# Patient Record
Sex: FEMALE | Race: WHITE | Employment: FULL TIME | URBAN - METROPOLITAN AREA
[De-identification: names, ages, dates, MRNs, and addresses within clinical notes are randomized per-mention and may not be internally consistent; named-entity substitution may affect disease eponyms.]

---

## 2018-01-18 PROCEDURE — 87186 SC STD MICRODIL/AGAR DIL: CPT | Performed by: OBSTETRICS & GYNECOLOGY

## 2018-01-18 PROCEDURE — 87077 CULTURE AEROBIC IDENTIFY: CPT | Performed by: OBSTETRICS & GYNECOLOGY

## 2018-01-18 PROCEDURE — 87086 URINE CULTURE/COLONY COUNT: CPT | Performed by: OBSTETRICS & GYNECOLOGY

## 2018-01-18 PROCEDURE — 81001 URINALYSIS AUTO W/SCOPE: CPT | Performed by: OBSTETRICS & GYNECOLOGY

## 2018-04-24 ENCOUNTER — APPOINTMENT (OUTPATIENT)
Dept: LAB | Age: 73
End: 2018-04-24
Attending: OPHTHALMOLOGY
Payer: COMMERCIAL

## 2018-04-24 PROCEDURE — 88305 TISSUE EXAM BY PATHOLOGIST: CPT | Performed by: OPHTHALMOLOGY

## 2018-06-18 PROBLEM — M54.9 BACK PAIN, UNSPECIFIED BACK LOCATION, UNSPECIFIED BACK PAIN LATERALITY, UNSPECIFIED CHRONICITY: Status: ACTIVE | Noted: 2018-06-18

## 2018-07-11 ENCOUNTER — TELEPHONE (OUTPATIENT)
Dept: SURGERY | Facility: CLINIC | Age: 73
End: 2018-07-11

## 2018-07-18 ENCOUNTER — TELEPHONE (OUTPATIENT)
Dept: SURGERY | Facility: CLINIC | Age: 73
End: 2018-07-18

## 2018-07-18 ENCOUNTER — OFFICE VISIT (OUTPATIENT)
Dept: SURGERY | Facility: CLINIC | Age: 73
End: 2018-07-18
Payer: COMMERCIAL

## 2018-07-18 VITALS
HEIGHT: 65 IN | SYSTOLIC BLOOD PRESSURE: 128 MMHG | BODY MASS INDEX: 23.16 KG/M2 | OXYGEN SATURATION: 96 % | WEIGHT: 139 LBS | DIASTOLIC BLOOD PRESSURE: 70 MMHG | HEART RATE: 72 BPM

## 2018-07-18 DIAGNOSIS — M43.02 CERVICAL SPONDYLOLYSIS: ICD-10-CM

## 2018-07-18 DIAGNOSIS — M54.12 CERVICAL RADICULITIS: ICD-10-CM

## 2018-07-18 DIAGNOSIS — M48.02 CERVICAL SPINAL STENOSIS: Primary | ICD-10-CM

## 2018-07-18 DIAGNOSIS — M47.812 ARTHROPATHY OF CERVICAL FACET JOINT: ICD-10-CM

## 2018-07-18 PROCEDURE — 99204 OFFICE O/P NEW MOD 45 MIN: CPT | Performed by: ANESTHESIOLOGY

## 2018-07-18 RX ORDER — PREGABALIN 25 MG/1
25 CAPSULE ORAL 2 TIMES DAILY
Qty: 60 CAPSULE | Refills: 0 | Status: ON HOLD | OUTPATIENT
Start: 2018-07-18 | End: 2018-08-21

## 2018-07-18 NOTE — PROGRESS NOTES
HPI:    Patient ID: Markus Hair is a 72year old female. HPI    Review of Systems         Current Outpatient Prescriptions: AmLODIPine Besylate 2.5 MG Oral Tab Take 1 tablet (2.5 mg total) by mouth daily.  Disp: 30 tablet Rfl: 3   diazepam (VALIUM) 5 Constitutional:                  ASSESSMENT/PLAN:   No diagnosis found. No orders of the defined types were placed in this encounter.       Meds This Visit:  No prescriptions requested or ordered in this encounter    Imaging & Referrals:  None       ID#1

## 2018-07-18 NOTE — PATIENT INSTRUCTIONS
Refill policies:    • Allow 2-3 business days for refills; controlled substances may take longer.   • Contact your pharmacy at least 5 days prior to running out of medication and have them send an electronic request or submit request through the “request re entire amount billed. Precertification and Prior Authorizations: If your physician has recommended that you have a procedure or additional testing performed.   [facility]) will contact your insurance carrier to obtain pre-certi update us prior to the procedure if you are experiencing any symptoms of infection such as cough, fever, chills, urinary symptoms, or have recently been prescribed antibiotics, have open wounds, have recently had surgery or dental procedures.      As you wi days  • Pradaxa 10 days  • Trental 7 days  • Eliquis (Apixaban) 3 days  • Xarelto (Rivaroxaban) 3 days  • Lovenox (Enoxaparin) 24 hours  • Aspirin  • 81mg 24 hours  • Greater than 81 mg (325mg) 7 days  • Coumadin       5 days  • Procedure may be cancelled minutes off) for comfort.     ** TO AVOID CANCELLATION AND/OR RESCHEDULING: PLEASE CALL [facility] PRE-PROCEDURE LINE [phone] FOR DETAILED INSTRUCTIONS FIVE TO SEVEN DAYS PRIOR TO PROCEDURE**

## 2018-07-20 NOTE — PROGRESS NOTES
Name: Ely Walsh   : 1945   DOS: 2018     Chief complaint: Neck Pain    History of present illness: Ely Walsh is a 72year old female complaining of pain in the neck for long time.   But recently her pain got significantly worse after a degeneration    • Microscopic hematuria 8/10/2016   • Osteopenia    • Primary osteoarthritis of both hips 8/8/2016   • Pure hypercholesterolemia    • S/P laparoscopic-assisted sigmoidectomy 10/14/2014   • Unspecified essential hypertension    • VARICELLA D-HIST D 3.5-45-1 MG OR TB12 1 TABLET  DAILY Disp:  Rfl:    FISH OIL OR None Entered Disp:  Rfl:    BABY ASPIRIN 81 MG OR CHEW 1TABLET DAILY Disp:  Rfl:      Past Surgical History:  1977: CABG, ARTERIAL, SINGLE  7/2012: COLONOSCOPY      Comment: Colonosc MD STEPHANY;  Location: [address] [place] [address]   Family History   Problem Relation Age of Onset   • Breast Cancer Mother      age 61   • Heart Disease Mother      CHF   • Breast Cancer Other      pat uncle-70's?    • Cancer Father      lymphoma Ht 65\"   Wt 139 lb   SpO2 96%   BMI 23.13 kg/m²    The patient is awake, alert, oriented and corporative. She has a normal affect. The patient ambulates with normal gait. HEENT: No gross lesion noted. PEERL. No icterus. Neck and Upper Extremity: Supple. 5    5   Knee Extension:   5    5   Knee Flexion:    5    5   Ant.  Tibialis:    5    5  Extensor Hallucis Longus:   5    5  Peroneals:     5    5  Gastrocsoleus:     5    5    Deep Tendon Reflexes:             (R)   (L)   Patella:    2   2   Ankle:    1

## 2018-07-30 ENCOUNTER — TELEPHONE (OUTPATIENT)
Dept: SURGERY | Facility: CLINIC | Age: 73
End: 2018-07-30

## 2018-08-03 ENCOUNTER — TELEPHONE (OUTPATIENT)
Dept: SURGERY | Facility: CLINIC | Age: 73
End: 2018-08-03

## 2018-08-03 NOTE — TELEPHONE ENCOUNTER
Spoke to patient, confirmed procedure date of 08/07 and to be checked in at outpatient registration at 1:15 pm. Patient called pre-procedure line and understood instructions.  Patient instructed to call office if there are additional questions after listeni

## 2018-08-07 ENCOUNTER — HOSPITAL ENCOUNTER (OUTPATIENT)
Facility: HOSPITAL | Age: 73
Setting detail: HOSPITAL OUTPATIENT SURGERY
Discharge: HOME | End: 2018-08-07
Attending: ANESTHESIOLOGY | Admitting: ANESTHESIOLOGY
Payer: COMMERCIAL

## 2018-08-07 ENCOUNTER — SURGERY (OUTPATIENT)
Age: 73
End: 2018-08-07

## 2018-08-07 ENCOUNTER — APPOINTMENT (OUTPATIENT)
Dept: GENERAL RADIOLOGY | Facility: HOSPITAL | Age: 73
End: 2018-08-07
Attending: ANESTHESIOLOGY
Payer: COMMERCIAL

## 2018-08-07 VITALS
DIASTOLIC BLOOD PRESSURE: 75 MMHG | RESPIRATION RATE: 14 BRPM | OXYGEN SATURATION: 100 % | TEMPERATURE: 98 F | HEART RATE: 58 BPM | SYSTOLIC BLOOD PRESSURE: 122 MMHG

## 2018-08-07 DIAGNOSIS — M43.02 CERVICAL SPONDYLOLYSIS: ICD-10-CM

## 2018-08-07 DIAGNOSIS — M48.02 CERVICAL SPINAL STENOSIS: ICD-10-CM

## 2018-08-07 PROCEDURE — 99152 MOD SED SAME PHYS/QHP 5/>YRS: CPT | Performed by: ANESTHESIOLOGY

## 2018-08-07 PROCEDURE — 3E0U3BZ INTRODUCTION OF ANESTHETIC AGENT INTO JOINTS, PERCUTANEOUS APPROACH: ICD-10-PCS | Performed by: ANESTHESIOLOGY

## 2018-08-07 PROCEDURE — 3E0U33Z INTRODUCTION OF ANTI-INFLAMMATORY INTO JOINTS, PERCUTANEOUS APPROACH: ICD-10-PCS | Performed by: ANESTHESIOLOGY

## 2018-08-07 RX ORDER — ONDANSETRON 2 MG/ML
4 INJECTION INTRAMUSCULAR; INTRAVENOUS ONCE AS NEEDED
Status: DISCONTINUED | OUTPATIENT
Start: 2018-08-07 | End: 2018-08-07

## 2018-08-07 RX ORDER — 0.9 % SODIUM CHLORIDE 0.9 %
VIAL (ML) INJECTION AS NEEDED
Status: DISCONTINUED | OUTPATIENT
Start: 2018-08-07 | End: 2018-08-07 | Stop reason: HOSPADM

## 2018-08-07 RX ORDER — MIDAZOLAM HYDROCHLORIDE 1 MG/ML
INJECTION INTRAMUSCULAR; INTRAVENOUS AS NEEDED
Status: DISCONTINUED | OUTPATIENT
Start: 2018-08-07 | End: 2018-08-07 | Stop reason: HOSPADM

## 2018-08-07 RX ORDER — DEXAMETHASONE SODIUM PHOSPHATE 10 MG/ML
INJECTION, SOLUTION INTRAMUSCULAR; INTRAVENOUS AS NEEDED
Status: DISCONTINUED | OUTPATIENT
Start: 2018-08-07 | End: 2018-08-07 | Stop reason: HOSPADM

## 2018-08-07 RX ORDER — LIDOCAINE HYDROCHLORIDE 10 MG/ML
INJECTION, SOLUTION EPIDURAL; INFILTRATION; INTRACAUDAL; PERINEURAL AS NEEDED
Status: DISCONTINUED | OUTPATIENT
Start: 2018-08-07 | End: 2018-08-07 | Stop reason: HOSPADM

## 2018-08-07 RX ORDER — SODIUM CHLORIDE, SODIUM LACTATE, POTASSIUM CHLORIDE, CALCIUM CHLORIDE 600; 310; 30; 20 MG/100ML; MG/100ML; MG/100ML; MG/100ML
100 INJECTION, SOLUTION INTRAVENOUS CONTINUOUS
Status: DISCONTINUED | OUTPATIENT
Start: 2018-08-07 | End: 2018-08-07

## 2018-08-07 RX ORDER — DIPHENHYDRAMINE HYDROCHLORIDE 50 MG/ML
50 INJECTION INTRAMUSCULAR; INTRAVENOUS ONCE AS NEEDED
Status: DISCONTINUED | OUTPATIENT
Start: 2018-08-07 | End: 2018-08-07

## 2018-08-07 RX ORDER — ONDANSETRON 2 MG/ML
4 INJECTION INTRAMUSCULAR; INTRAVENOUS ONCE AS NEEDED
Status: DISCONTINUED | OUTPATIENT
Start: 2018-08-07 | End: 2018-08-07 | Stop reason: HOSPADM

## 2018-08-07 NOTE — OPERATIVE REPORT
[facility]  Operative Report  2018     Yelena Serrato Patient Status:  Hospital Outpatient Surgery    1945 MRN [id number]   [facility] [place] [facility] Attending Fabian Bacon MD   Hosp Day # 0 PCP Del Dominguez MD introduced and advanced into each corresponding facet joint atraumatically at left C3-C4, C4-C5, C5-C6 and C6-C7 level under fluoroscopic guidance.   Following negative aspiration for CSF and blood, approximately, 0.5 mL of 1% lidocaine with 10 mg of Decadr

## 2018-08-07 NOTE — H&P
History & Physical Examination    Patient Name: Yaz Wade  MRN: [id number]  [facility]: [id number]  YOB: 1945    Pre-Operative Diagnosis:  Cervical spinal stenosis [M48.02]  Cervical spondylolysis [M43.02]    Present Illness: A 72year old female POLYPECTOMY 38348,49313;  Surgeon: Juan Rothman MD;  Location: [address] [place] [address]  3/7/2016: [address] NDL/CATH SPI DX/THER QLB N/A      Comment: Procedure: CERVICAL EPIDURAL INJECTION;                 [address] Standard drinks or equivalent per week         Comment: 1 glass of wine daily       SYSTEM Check if Review is Normal Check if Physical Exam is Normal If not normal, please explain:   HEENT [x ] [x ]    NECK & BACK [ ] [ ] Tenderness to palpation left cervi

## 2018-08-08 ENCOUNTER — TELEPHONE (OUTPATIENT)
Dept: SURGERY | Facility: CLINIC | Age: 73
End: 2018-08-08

## 2018-08-08 NOTE — TELEPHONE ENCOUNTER
Pt. Had question regarding nausea and if she could take a prescribed half of a sleep aid. I advised the patient to take in plenty of fluids and that should help dissipate any lingering feelings of sedation.  And after she is able to keep fluids and food priscila

## 2018-08-16 ENCOUNTER — TELEPHONE (OUTPATIENT)
Dept: SURGERY | Facility: CLINIC | Age: 73
End: 2018-08-16

## 2018-08-16 NOTE — TELEPHONE ENCOUNTER
Spoke to patient, confirmed procedure date of 08/21 and to be checked in at outpatient registration at 10:00 am. Patient instructed to call pre-procedure line before procedure at [phone].  Patient instructed to call office if there are additional quest

## 2018-08-16 NOTE — TELEPHONE ENCOUNTER
Spoke with pt and reviewed pre-procedure instructions and medications that should be held prior to procedure. Pt verbalized understanding and had no additional questions.

## 2018-08-21 ENCOUNTER — SURGERY (OUTPATIENT)
Age: 73
End: 2018-08-21

## 2018-08-21 ENCOUNTER — APPOINTMENT (OUTPATIENT)
Dept: GENERAL RADIOLOGY | Facility: HOSPITAL | Age: 73
End: 2018-08-21
Attending: ANESTHESIOLOGY
Payer: COMMERCIAL

## 2018-08-21 ENCOUNTER — HOSPITAL ENCOUNTER (OUTPATIENT)
Facility: HOSPITAL | Age: 73
Setting detail: HOSPITAL OUTPATIENT SURGERY
Discharge: HOME | End: 2018-08-21
Attending: ANESTHESIOLOGY | Admitting: ANESTHESIOLOGY
Payer: COMMERCIAL

## 2018-08-21 VITALS
RESPIRATION RATE: 18 BRPM | TEMPERATURE: 98 F | HEART RATE: 68 BPM | SYSTOLIC BLOOD PRESSURE: 139 MMHG | OXYGEN SATURATION: 100 % | DIASTOLIC BLOOD PRESSURE: 68 MMHG

## 2018-08-21 DIAGNOSIS — M43.02 CERVICAL SPONDYLOLYSIS: ICD-10-CM

## 2018-08-21 DIAGNOSIS — M48.02 CERVICAL SPINAL STENOSIS: ICD-10-CM

## 2018-08-21 PROCEDURE — 3E0R33Z INTRODUCTION OF ANTI-INFLAMMATORY INTO SPINAL CANAL, PERCUTANEOUS APPROACH: ICD-10-PCS | Performed by: ANESTHESIOLOGY

## 2018-08-21 PROCEDURE — 99152 MOD SED SAME PHYS/QHP 5/>YRS: CPT | Performed by: ANESTHESIOLOGY

## 2018-08-21 RX ORDER — ONDANSETRON 2 MG/ML
4 INJECTION INTRAMUSCULAR; INTRAVENOUS ONCE AS NEEDED
Status: DISCONTINUED | OUTPATIENT
Start: 2018-08-21 | End: 2018-08-21

## 2018-08-21 RX ORDER — LIDOCAINE HYDROCHLORIDE 10 MG/ML
INJECTION, SOLUTION EPIDURAL; INFILTRATION; INTRACAUDAL; PERINEURAL AS NEEDED
Status: DISCONTINUED | OUTPATIENT
Start: 2018-08-21 | End: 2018-08-21 | Stop reason: HOSPADM

## 2018-08-21 RX ORDER — DEXAMETHASONE SODIUM PHOSPHATE 10 MG/ML
INJECTION, SOLUTION INTRAMUSCULAR; INTRAVENOUS AS NEEDED
Status: DISCONTINUED | OUTPATIENT
Start: 2018-08-21 | End: 2018-08-21 | Stop reason: HOSPADM

## 2018-08-21 RX ORDER — 0.9 % SODIUM CHLORIDE 0.9 %
VIAL (ML) INJECTION AS NEEDED
Status: DISCONTINUED | OUTPATIENT
Start: 2018-08-21 | End: 2018-08-21 | Stop reason: HOSPADM

## 2018-08-21 RX ORDER — DIPHENHYDRAMINE HYDROCHLORIDE 50 MG/ML
50 INJECTION INTRAMUSCULAR; INTRAVENOUS ONCE AS NEEDED
Status: DISCONTINUED | OUTPATIENT
Start: 2018-08-21 | End: 2018-08-21

## 2018-08-21 RX ORDER — ONDANSETRON 2 MG/ML
4 INJECTION INTRAMUSCULAR; INTRAVENOUS ONCE AS NEEDED
Status: DISCONTINUED | OUTPATIENT
Start: 2018-08-21 | End: 2018-08-21 | Stop reason: HOSPADM

## 2018-08-21 RX ORDER — SODIUM CHLORIDE, SODIUM LACTATE, POTASSIUM CHLORIDE, CALCIUM CHLORIDE 600; 310; 30; 20 MG/100ML; MG/100ML; MG/100ML; MG/100ML
100 INJECTION, SOLUTION INTRAVENOUS CONTINUOUS
Status: DISCONTINUED | OUTPATIENT
Start: 2018-08-21 | End: 2018-08-21

## 2018-08-21 RX ORDER — MIDAZOLAM HYDROCHLORIDE 1 MG/ML
INJECTION INTRAMUSCULAR; INTRAVENOUS AS NEEDED
Status: DISCONTINUED | OUTPATIENT
Start: 2018-08-21 | End: 2018-08-21 | Stop reason: HOSPADM

## 2018-08-21 NOTE — H&P
History & Physical Examination    Patient Name: Chaya Harrison  MRN: [id number]  [facility]: [id number]  YOB: 1945    Pre-Operative Diagnosis:  Cervical spinal stenosis [M48.02]  Cervical spondylolysis [M43.02]    Present Illness: A 72year old female (GLUCOSAMINE RELIEF OR) Take by mouth. Disp:  Rfl:  Taking   Rosuvastatin Calcium (CRESTOR) 20 MG Oral Tab Take 1 tablet by mouth nightly. Disp: 1 tablet Rfl: 0 Taking   Ergocalciferol (VITAMIN D OR) Take  by mouth.  Disp:  Rfl:  Taking   D-HIST D 3.5-45-1 Procedure: ESOPHAGOGASTRODUODENOSCOPY,                COLONOSCOPY, POSSIBLE BIOPSY, POSSIBLE                POLYPECTOMY 15765,03979;  Surgeon: Ambika Fernandez MD;  Location: [address] [place] [address]  3/7/2016: FLUOR GID & L date: 8/15/1980    Smokeless tobacco: Never Used    Comment: quit 1977    Alcohol use Yes  8.4 oz/week    14 Standard drinks or equivalent per week         Comment: 1 glass of wine daily       SYSTEM Check if Review is Normal Check if Physical Exam is Norm

## 2018-08-23 NOTE — OPERATIVE REPORT
[facility]  Operative Report  2018     Allie Reaves Patient Status:  Hospital Outpatient Surgery    1945 MRN [id number]   [facility] No att. providers found   [facility] Day # 0 PCP Bridgett Garcia, 20-gauge Tuohy needle was introduced and advanced under fluoroscopy at C6-C7 level. The epidural space was reached by using a loss of resistance to air technique. There was no C. S.F. or blood through the needle.  After obtaining a good epidurogram by injec

## 2018-09-05 ENCOUNTER — OFFICE VISIT (OUTPATIENT)
Dept: PAIN CLINIC | Facility: CLINIC | Age: 73
End: 2018-09-05
Payer: COMMERCIAL

## 2018-09-05 VITALS
SYSTOLIC BLOOD PRESSURE: 116 MMHG | WEIGHT: 139 LBS | DIASTOLIC BLOOD PRESSURE: 60 MMHG | OXYGEN SATURATION: 93 % | BODY MASS INDEX: 23.16 KG/M2 | HEIGHT: 65 IN | HEART RATE: 76 BPM

## 2018-09-05 DIAGNOSIS — M54.12 CERVICAL RADICULITIS: Primary | ICD-10-CM

## 2018-09-05 PROCEDURE — 99214 OFFICE O/P EST MOD 30 MIN: CPT | Performed by: ANESTHESIOLOGY

## 2018-09-05 NOTE — PATIENT INSTRUCTIONS
Refill policies:    • Allow 2-3 business days for refills; controlled substances may take longer.   • Contact your pharmacy at least 5 days prior to running out of medication and have them send an electronic request or submit request through the “request re entire amount billed. Precertification and Prior Authorizations: If your physician has recommended that you have a procedure or additional testing performed.   [facility]) will contact your insurance carrier to obtain pre-certi

## 2018-09-06 ENCOUNTER — TELEPHONE (OUTPATIENT)
Dept: SURGERY | Facility: CLINIC | Age: 73
End: 2018-09-06

## 2018-09-06 DIAGNOSIS — M54.12 CERVICAL RADICULOPATHY: Primary | ICD-10-CM

## 2018-09-06 DIAGNOSIS — M47.812 ARTHROPATHY OF CERVICAL FACET JOINT: ICD-10-CM

## 2018-09-06 DIAGNOSIS — G89.29 CHRONIC THORACIC BACK PAIN, UNSPECIFIED BACK PAIN LATERALITY: ICD-10-CM

## 2018-09-06 DIAGNOSIS — M54.6 CHRONIC THORACIC BACK PAIN, UNSPECIFIED BACK PAIN LATERALITY: ICD-10-CM

## 2018-09-06 NOTE — TELEPHONE ENCOUNTER
Pts last work note had a return date of 9/21/18 but Pt is seeing Dr on 9/24/18 an will not return to work until 9/25/18.  Please revise note and fax to [facility], Cristiano, at [phone] and [employer], Kenny Burris, at [phone]

## 2018-09-06 NOTE — TELEPHONE ENCOUNTER
Spoke with patient and informed patient of message below. Pt verbalized understanding. See other TE dated 9-6-18 for physical therapy order.

## 2018-09-06 NOTE — TELEPHONE ENCOUNTER
Spoke with pt who needs the referral to say \"work conditioning\" not work hardening and then the physical therapy can be done at [facility]. Pt has no additional needs at this time.

## 2018-09-06 NOTE — TELEPHONE ENCOUNTER
Compound prescription, insurance information and face sheet faxed to [employer], fax confirmation received.

## 2018-09-06 NOTE — TELEPHONE ENCOUNTER
Work note return to work date has been adjusted, ok per Dr. Alonzo Stevens. Also,she should contact AT physical therapy, Christian Starr should be able to help with the work conditioning.   Thanks

## 2018-09-06 NOTE — TELEPHONE ENCOUNTER
Pt is unable to find a PT who does both work hardening and work conditioning therapies. Can Dr provide a PT who does both? Or can Dr revise order to state only work conditioning?

## 2018-09-10 ENCOUNTER — OFFICE VISIT (OUTPATIENT)
Dept: PHYSICAL THERAPY | Age: 73
End: 2018-09-10
Attending: NURSE PRACTITIONER
Payer: COMMERCIAL

## 2018-09-10 PROCEDURE — 97162 PT EVAL MOD COMPLEX 30 MIN: CPT

## 2018-09-10 PROCEDURE — 97110 THERAPEUTIC EXERCISES: CPT

## 2018-09-10 NOTE — PROGRESS NOTES
Name: Lukas Lopez   : 1945   DOS: 2018     Pain Clinic Follow Up Visit:   Lukas Lopez is a 72year old female who presents for recheck of her chronic neck pain.  She is status post left diagnostic cervical facet joint injection and cervical f food Disp: 60 tablet Rfl: 0   Cetirizine HCl (ZYRTEC ALLERGY) 10 MG Oral Cap Take 1 tablet by mouth daily.  Disp:  Rfl:    amoxicillin 500 MG Oral Cap TAKE 4 CAPSULES BY MOUTH 1HR PRIOR TO EACH DENTAL VISIT Disp:  Rfl: 1   Resveratrol 100 MG Oral Cap Take 1 filled today:  Requested Prescriptions     Signed Prescriptions Disp Refills   • CUSTOM MEDICATION 1 each 11     Sig: CMPD Flurbiprofen 3.5%, Cyclobenzaprine 0.5%, Gabapentin 1%, Lidocaine 2.25%, Prilocaine 2.25%    Apply 4 grams to affected area three to

## 2018-09-10 NOTE — PROGRESS NOTES
SPINE EVALUATION:   Referring Physician: Dr. Ceci Machado MD  Diagnosis: Cervical radiculopathy     Date of Service: 9/10/2018     PATIENT SUMMARY   Markus Hair is a 72year old y/o female who presents to therapy today with complaints of 3 month his tightness  Levator Scap: moderate tightness  Pec Major: moderate tightness     Special tests: Positive special tests, neural tension, vertebral artery    Balance: Not tested today    Today’s Treatment and Response: Supine manual traction cervical spine gra any questions, please contact me at Dept: [phone]    Sincerely,  Electronically signed by therapist: Charlie Heredia    Physician's certification required: Yes  I certify the need for these services furnished under this plan of treatment and while

## 2018-09-12 ENCOUNTER — OFFICE VISIT (OUTPATIENT)
Dept: PHYSICAL THERAPY | Age: 73
End: 2018-09-12
Attending: NURSE PRACTITIONER
Payer: COMMERCIAL

## 2018-09-12 PROCEDURE — 97012 MECHANICAL TRACTION THERAPY: CPT

## 2018-09-12 PROCEDURE — 97110 THERAPEUTIC EXERCISES: CPT

## 2018-09-12 PROCEDURE — 97140 MANUAL THERAPY 1/> REGIONS: CPT

## 2018-09-13 NOTE — PROGRESS NOTES
Dx: Cervical radiculopathy         Authorized # of Visits:  8         Next MD visit: none scheduled  Fall Risk: standard         Precautions: Osteoporosis Heart disease.               Subjective: Pain neck and right arm 4-6/10, numbness and tingling in the hold 1 min rest repeated 6 times         Pt education 10 mins         Seated shrugs x 10, scap squeezes x 10         Seated isometric cervical spine flex, ext, side flex, rotation x 10 each           Charges: MTx, MT 1, EX 1       Total Timed Treatment: 45

## 2018-09-14 NOTE — TELEPHONE ENCOUNTER
Pt requesting letter that was sent needs to be signed by provider and sent back.  Would like it to be sent thru [facility]

## 2018-09-14 NOTE — TELEPHONE ENCOUNTER
Pt stated that she received corrected letter with dates of 9/5/18-9/25/18 in the mail; however, letter was not signed and her work will not accept without a signature.      Pt informed that Dr Jose Manuel Fair can sign letter, we can not send signed letter through [facility]

## 2018-09-18 ENCOUNTER — OFFICE VISIT (OUTPATIENT)
Dept: PHYSICAL THERAPY | Age: 73
End: 2018-09-18
Attending: NURSE PRACTITIONER
Payer: COMMERCIAL

## 2018-09-18 PROCEDURE — 97140 MANUAL THERAPY 1/> REGIONS: CPT

## 2018-09-18 PROCEDURE — 97110 THERAPEUTIC EXERCISES: CPT

## 2018-09-19 NOTE — PROGRESS NOTES
Dx: Cervical radiculopathy         Authorized # of Visits:  8         Next MD visit: none scheduled  Fall Risk: standard         Precautions: Osteoporosis Heart disease.               Subjective: Pain neck and right arm 4-6/10, numbness and tingling in the mobilizations to the left grade 2-3 5 mins        Supine sh depression stretch 3 x 10 sec hold Supine ULNT 1 L through full range x 10        Seated rotation mobilizations with movement grade 2-3 C4, 5, 6 x 10 each Seated rotation mobilizations with moveme

## 2018-09-21 ENCOUNTER — OFFICE VISIT (OUTPATIENT)
Dept: PHYSICAL THERAPY | Age: 73
End: 2018-09-21
Attending: NURSE PRACTITIONER
Payer: COMMERCIAL

## 2018-09-21 PROCEDURE — 97140 MANUAL THERAPY 1/> REGIONS: CPT

## 2018-09-21 PROCEDURE — 97110 THERAPEUTIC EXERCISES: CPT

## 2018-09-21 PROCEDURE — 97014 ELECTRIC STIMULATION THERAPY: CPT

## 2018-09-21 NOTE — PROGRESS NOTES
Dx: Cervical radiculopathy         Authorized # of Visits:  8         Next MD visit: 9/24/18  Fall Risk: standard         Precautions: Osteoporosis Heart disease. Subjective: Increased neck pain following the last therapy session.  This week I Date:               TX#: 5/ Date:               TX#: 6/ Date:               TX#: 7/ Date:               TX#: 8/   Supine PROM cervical spine side flexion, rotation R/L x 10 each Supine PROM cervical spine side flexion, rotation R/L x 10 each Supine sh depr

## 2018-09-24 ENCOUNTER — TELEPHONE (OUTPATIENT)
Dept: PAIN CLINIC | Facility: CLINIC | Age: 73
End: 2018-09-24

## 2018-09-24 ENCOUNTER — TELEPHONE (OUTPATIENT)
Dept: SURGERY | Facility: CLINIC | Age: 73
End: 2018-09-24

## 2018-09-24 ENCOUNTER — OFFICE VISIT (OUTPATIENT)
Dept: PAIN CLINIC | Facility: CLINIC | Age: 73
End: 2018-09-24
Payer: COMMERCIAL

## 2018-09-24 ENCOUNTER — OFFICE VISIT (OUTPATIENT)
Dept: PHYSICAL THERAPY | Age: 73
End: 2018-09-24
Attending: NURSE PRACTITIONER
Payer: COMMERCIAL

## 2018-09-24 VITALS
BODY MASS INDEX: 23 KG/M2 | SYSTOLIC BLOOD PRESSURE: 116 MMHG | HEIGHT: 65 IN | DIASTOLIC BLOOD PRESSURE: 66 MMHG | OXYGEN SATURATION: 96 % | HEART RATE: 71 BPM

## 2018-09-24 DIAGNOSIS — M54.12 CERVICAL RADICULOPATHY: Primary | ICD-10-CM

## 2018-09-24 PROCEDURE — 97110 THERAPEUTIC EXERCISES: CPT

## 2018-09-24 PROCEDURE — 97014 ELECTRIC STIMULATION THERAPY: CPT

## 2018-09-24 PROCEDURE — 99213 OFFICE O/P EST LOW 20 MIN: CPT | Performed by: NURSE PRACTITIONER

## 2018-09-24 PROCEDURE — 97140 MANUAL THERAPY 1/> REGIONS: CPT

## 2018-09-24 NOTE — PATIENT INSTRUCTIONS
Refill policies:    • Allow 2-3 business days for refills; controlled substances may take longer.   • Contact your pharmacy at least 5 days prior to running out of medication and have them send an electronic request or submit request through the “request re entire amount billed. Precertification and Prior Authorizations: If your physician has recommended that you have a procedure or additional testing performed.   [facility]) will contact your insurance carrier to obtain pre-certi

## 2018-09-24 NOTE — TELEPHONE ENCOUNTER
Pt asking for extended leave of absence note to excuse her until after her Post Injection FU.  Pt states she \"already gave Darrona Railing the fax numbers\"

## 2018-09-24 NOTE — PROGRESS NOTES
Name: Alec Belcher   : 1945   DOS: 2018     Pain Clinic Follow Up Visit:   Alec Belcher is a 72year old female who presents for recheck of her chronic neck pain.     Status post: 2018: Cervical epidural steroid injection   total) by mouth 2 (two) times daily as needed. Take with food Disp: 60 tablet Rfl: 0   Cetirizine HCl (ZYRTEC ALLERGY) 10 MG Oral Cap Take 1 tablet by mouth daily. Disp:  Rfl:    Resveratrol 100 MG Oral Cap Take 1 tablet by mouth daily.  Disp:  Rfl:    Calc would like to proceed. She prefers her injection with sedation. She does experience nausea with sedation; therefore, I have educated her that she will be able to receive Zofran prior to the procedure to help minimize nausea.   We will pre-certify the inje

## 2018-09-24 NOTE — TELEPHONE ENCOUNTER
Pt seen in [address] today by Ceasar Capellan and recommended for NEFTALI (X 2). Please begin PA process for procedure(s).      Laterality: n/a  Level(s): n/a    Pt informed callback will be given when PA feedback received and procedure date to be scheduled after approv

## 2018-09-24 NOTE — PROGRESS NOTES
Dx: Cervical radiculopathy         Authorized # of Visits:  8         Next MD visit: 9/24/18  Fall Risk: standard         Precautions: Osteoporosis Heart disease.             Progress report:  Subjective: Pain at the base of the neck \" the bobble head\" 3/ depression stretch 3 x 10 sec hold Seated AROM cervical spine flex, side flex, rotation       Supine cervical spine distraction mobilizations Grade 2 30 secs  Grade 3 30 secs Supine cervical spine joint mobilizations grade 2-3 distraction 5 mins Supine PRO

## 2018-09-25 NOTE — TELEPHONE ENCOUNTER
I have completed the letter and will return to Albina when she comes today. Please fax to previous work numbers that should be on file for patient. If you cannot find them, please call her, she is happy to give them to you again. Thanks!

## 2018-09-25 NOTE — TELEPHONE ENCOUNTER
Spoke with pt that states she needs to have a date otherwise the [employer] will send her file to a disciplinary action. As of  9-25-18 pt's leave is up.  Pt is requesting a letter be sent with a date on it of at least two weeks, after that two week period pt wo

## 2018-09-25 NOTE — TELEPHONE ENCOUNTER
Pt called stating employer will need return dates for work and will need signature as well. Once completed please fax back to employer.

## 2018-09-25 NOTE — TELEPHONE ENCOUNTER
Letter faxed to numbers below, confirmation received. Rylee Huddleston, at [phone] and   [employer] at [phone].

## 2018-09-25 NOTE — TELEPHONE ENCOUNTER
Spoke with DONNY Grace. New letter pended for Dr. Demetrio toro with a return to work date of 11-1-18.

## 2018-10-01 NOTE — TELEPHONE ENCOUNTER
Started PA for NEFTALI 594[address] on [facility]  Uploaded clinical notes   Pending Ref# [id number]

## 2018-10-02 NOTE — TELEPHONE ENCOUNTER
Pt called stating she spoke with her insurance carrier they informed her that one of the two procedures has been approved.  Pt would like to schedule injection per pt approval expires 10/11/18

## 2018-10-12 ENCOUNTER — TELEPHONE (OUTPATIENT)
Dept: SURGERY | Facility: CLINIC | Age: 73
End: 2018-10-12

## 2018-10-12 ENCOUNTER — TELEPHONE (OUTPATIENT)
Dept: PAIN CLINIC | Facility: CLINIC | Age: 73
End: 2018-10-12

## 2018-10-12 NOTE — TELEPHONE ENCOUNTER
Patient would also like to schedule the next two procedures a week apart due to being off work. Please call, Monday okay. Spoke to patient, confirmed procedure date of 10/18/18 at 1:30 pm to be checked in at outpatient registration.  Patient instructed t

## 2018-10-12 NOTE — TELEPHONE ENCOUNTER
Per message below, patient requesting to schedule next 2 NEFTALI procedures. Per OV notes, DONNY Penaloza. Advising 2 total procedures. First procedure is scheduled for 10/18/18. New encounter for this message opened up for today's date.

## 2018-10-12 NOTE — TELEPHONE ENCOUNTER
Per pre-procedure outreach call from 10/12/18, patient requesting to schedule next 2 NEFTALI procedures. Per OV notes, DONNY Dias. Advising 2 total procedures. First procedure is scheduled for 10/18/18.       Routed to PA Department-Are we able to do P

## 2018-10-15 NOTE — TELEPHONE ENCOUNTER
Prior authorization request for 2nd NEFTALI submitted via [facility] online, clinical notes uploaded directly, request pending, pending reference [id number]

## 2018-10-18 ENCOUNTER — APPOINTMENT (OUTPATIENT)
Dept: GENERAL RADIOLOGY | Facility: HOSPITAL | Age: 73
End: 2018-10-18
Attending: ANESTHESIOLOGY
Payer: COMMERCIAL

## 2018-10-18 ENCOUNTER — HOSPITAL ENCOUNTER (OUTPATIENT)
Facility: HOSPITAL | Age: 73
Setting detail: HOSPITAL OUTPATIENT SURGERY
Discharge: HOME | End: 2018-10-18
Attending: ANESTHESIOLOGY | Admitting: ANESTHESIOLOGY
Payer: COMMERCIAL

## 2018-10-18 VITALS
DIASTOLIC BLOOD PRESSURE: 74 MMHG | HEART RATE: 72 BPM | OXYGEN SATURATION: 95 % | TEMPERATURE: 98 F | RESPIRATION RATE: 18 BRPM | SYSTOLIC BLOOD PRESSURE: 140 MMHG

## 2018-10-18 DIAGNOSIS — M54.12 CERVICAL RADICULOPATHY: ICD-10-CM

## 2018-10-18 PROCEDURE — 99152 MOD SED SAME PHYS/QHP 5/>YRS: CPT | Performed by: ANESTHESIOLOGY

## 2018-10-18 PROCEDURE — 3E0R33Z INTRODUCTION OF ANTI-INFLAMMATORY INTO SPINAL CANAL, PERCUTANEOUS APPROACH: ICD-10-PCS | Performed by: ANESTHESIOLOGY

## 2018-10-18 PROCEDURE — 3E0R3KZ INTRODUCTION OF OTHER DIAGNOSTIC SUBSTANCE INTO SPINAL CANAL, PERCUTANEOUS APPROACH: ICD-10-PCS | Performed by: ANESTHESIOLOGY

## 2018-10-18 RX ORDER — 0.9 % SODIUM CHLORIDE 0.9 %
VIAL (ML) INJECTION AS NEEDED
Status: DISCONTINUED | OUTPATIENT
Start: 2018-10-18 | End: 2018-10-18 | Stop reason: HOSPADM

## 2018-10-18 RX ORDER — DIPHENHYDRAMINE HYDROCHLORIDE 50 MG/ML
50 INJECTION INTRAMUSCULAR; INTRAVENOUS ONCE AS NEEDED
Status: DISCONTINUED | OUTPATIENT
Start: 2018-10-18 | End: 2018-10-18

## 2018-10-18 RX ORDER — ONDANSETRON 2 MG/ML
INJECTION INTRAMUSCULAR; INTRAVENOUS
Status: DISCONTINUED
Start: 2018-10-18 | End: 2018-10-18

## 2018-10-18 RX ORDER — DEXAMETHASONE SODIUM PHOSPHATE 10 MG/ML
INJECTION, SOLUTION INTRAMUSCULAR; INTRAVENOUS AS NEEDED
Status: DISCONTINUED | OUTPATIENT
Start: 2018-10-18 | End: 2018-10-18 | Stop reason: HOSPADM

## 2018-10-18 RX ORDER — LIDOCAINE HYDROCHLORIDE 10 MG/ML
INJECTION, SOLUTION EPIDURAL; INFILTRATION; INTRACAUDAL; PERINEURAL AS NEEDED
Status: DISCONTINUED | OUTPATIENT
Start: 2018-10-18 | End: 2018-10-18 | Stop reason: HOSPADM

## 2018-10-18 RX ORDER — SODIUM CHLORIDE, SODIUM LACTATE, POTASSIUM CHLORIDE, CALCIUM CHLORIDE 600; 310; 30; 20 MG/100ML; MG/100ML; MG/100ML; MG/100ML
100 INJECTION, SOLUTION INTRAVENOUS CONTINUOUS
Status: DISCONTINUED | OUTPATIENT
Start: 2018-10-18 | End: 2018-10-18

## 2018-10-18 RX ORDER — MIDAZOLAM HYDROCHLORIDE 1 MG/ML
INJECTION INTRAMUSCULAR; INTRAVENOUS AS NEEDED
Status: DISCONTINUED | OUTPATIENT
Start: 2018-10-18 | End: 2018-10-18 | Stop reason: HOSPADM

## 2018-10-18 RX ORDER — ONDANSETRON 2 MG/ML
4 INJECTION INTRAMUSCULAR; INTRAVENOUS ONCE AS NEEDED
Status: COMPLETED | OUTPATIENT
Start: 2018-10-18 | End: 2018-10-18

## 2018-10-18 RX ORDER — ONDANSETRON 2 MG/ML
4 INJECTION INTRAMUSCULAR; INTRAVENOUS ONCE AS NEEDED
Status: DISCONTINUED | OUTPATIENT
Start: 2018-10-18 | End: 2018-10-18

## 2018-10-18 NOTE — OPERATIVE REPORT
[facility]  Operative Report  10/18/2018     Afshan Acosta Patient Status:  Hospital Outpatient Surgery    1945 MRN [id number]   [facility] SURGERY Attending Chloé Ny MD   Hosp Day # 0 PCP Leonie Hunt MD     Indicatio After obtaining a good epidurogram by injecting Omnipaque 180 1 mL, a combination of normal saline and dexamethasone 10 mg in total volume of 4 mL was injected. The needle was then flushed with normal saline 1 mL. The stylet re-applied.   The needle was w

## 2018-10-18 NOTE — TELEPHONE ENCOUNTER
Spoke to Olga Waldrop at [facility], requested to change provider on approval for NEFTALI. Olga Waldrop states that since the case has been approved, cannot change provider, only the date of service.  New case initiated under Dr. Chrissie Banegas, request has been approved, authorization #

## 2018-10-18 NOTE — H&P
History & Physical Examination    Patient Name: Luiz Borjas  MRN: [id number]  [facility]: [id number]  YOB: 1945    Pre-Operative Diagnosis:  Cervical radiculopathy [M54.12]    Present Illness: A 72year old female with neck pain is here for cervical 20 MG Oral Tab Take 1 tablet by mouth nightly. Disp: 1 tablet Rfl: 0 Taking   Ergocalciferol (VITAMIN D OR) Take  by mouth.  Disp:  Rfl:  Taking   D-HIST D 3.5-45-1 MG OR TB12 1 TABLET  DAILY Disp:  Rfl:  Taking   FISH OIL OR None Entered Disp:  Rfl:  10/13 OR   • CERVICAL FACET INJECTION Left 8/7/2018    Performed by Radha Turner MD at [facility] MAIN OR   • COLONOSCOPY  7/2012    Colonoscopy was challenging, had sigmoid diverticulosis and hemorrhoids.  repeat 5 yrs MAC   • COLONOSCOPY,DIAGNOSTIC  7/6/2012    Pr Cancer Father         lymphoma   • Stroke Maternal Grandfather    • Heart Disease Other      Social History    Tobacco Use      Smoking status: Former Smoker        Packs/day: 1.00        Years: 25.00        Pack years: 25        Types: Cigarettes        Q

## 2018-10-19 ENCOUNTER — TELEPHONE (OUTPATIENT)
Dept: SURGERY | Facility: CLINIC | Age: 73
End: 2018-10-19

## 2018-10-19 NOTE — TELEPHONE ENCOUNTER
Spoke with pt and explained that our office is now obtaining Prior Authorization for procedures prior to scheduling. After reviewing pt's chart it is noted that PA has been requested for the 2nd NEFTALI but has not been obtained as of this time.   Pt notified

## 2018-10-23 NOTE — TELEPHONE ENCOUNTER
Patient advised of message, patient verbalized disappointment, states she did discuss thoracic pain at recent office visits. Advised patient to schedule follow up if wants to proceed with changing procedure. Offered 10/24 appt with APC, patient declined.  P

## 2018-10-23 NOTE — TELEPHONE ENCOUNTER
Patient contacted to schedule last NEFTALI. Patient states when she spoke with Dr. Roel Cerna on 10/18, he indicated pain was in thoracic region, not cervical. Patient is requesting to change procedure to Thoracic [place]. Please advise.

## 2018-10-23 NOTE — TELEPHONE ENCOUNTER
Patient will need to be re-evaluated prior to doing the thoracic epidural injection since there is no documentation of thoracic pain. Please call patient and have her make an office visit.

## 2018-10-25 ENCOUNTER — OFFICE VISIT (OUTPATIENT)
Dept: PAIN CLINIC | Facility: CLINIC | Age: 73
End: 2018-10-25
Payer: COMMERCIAL

## 2018-10-25 ENCOUNTER — HOSPITAL ENCOUNTER (OUTPATIENT)
Dept: GENERAL RADIOLOGY | Facility: HOSPITAL | Age: 73
Discharge: HOME | End: 2018-10-25
Attending: ANESTHESIOLOGY
Payer: COMMERCIAL

## 2018-10-25 VITALS
WEIGHT: 142 LBS | HEART RATE: 70 BPM | BODY MASS INDEX: 23.66 KG/M2 | SYSTOLIC BLOOD PRESSURE: 136 MMHG | OXYGEN SATURATION: 94 % | DIASTOLIC BLOOD PRESSURE: 64 MMHG | HEIGHT: 65 IN

## 2018-10-25 DIAGNOSIS — M48.02 FORAMINAL STENOSIS OF CERVICAL REGION: ICD-10-CM

## 2018-10-25 DIAGNOSIS — G89.29 CHRONIC SCAPULAR PAIN: ICD-10-CM

## 2018-10-25 DIAGNOSIS — S23.9XXA SPRAIN OF THORACIC REGION: ICD-10-CM

## 2018-10-25 DIAGNOSIS — M89.8X1 CHRONIC SCAPULAR PAIN: ICD-10-CM

## 2018-10-25 DIAGNOSIS — M89.8X1 CHRONIC SCAPULAR PAIN: Primary | ICD-10-CM

## 2018-10-25 DIAGNOSIS — G89.29 CHRONIC SCAPULAR PAIN: Primary | ICD-10-CM

## 2018-10-25 PROCEDURE — 72072 X-RAY EXAM THORAC SPINE 3VWS: CPT | Performed by: ANESTHESIOLOGY

## 2018-10-25 PROCEDURE — 99213 OFFICE O/P EST LOW 20 MIN: CPT | Performed by: ANESTHESIOLOGY

## 2018-10-25 NOTE — PROGRESS NOTES
Name: Bella Lagunas   : 1945   DOS: 10/25/2018     Pain Clinic Follow Up Visit:   Patient presents with:   Follow - Up: Right sided thoracic pain /10 and bilateral shoulder pain 2/10      Bella Lagunas is a 72year old female left-handed female who Rfl:    Resveratrol 100 MG Oral Cap Take 1 tablet by mouth daily. Disp:  Rfl:    VALERIAN ROOT OR Take 2.5 tablets by mouth nightly. Disp:  Rfl:    TURMERIC CURCUMIN OR Take 1 tablet by mouth 2 (two) times daily.  Disp:  Rfl:    Calcium Carbonate-Vit D-Min consultations:XR THORACIC SPINE (2 VIEWS) (CPT=72070)  The patient indicates understanding of these issues and agrees to the plan. No Follow-up on file.     Jemima Mendiola MD, 10/25/2018, 1:34 PM

## 2018-10-25 NOTE — PATIENT INSTRUCTIONS
Refill policies:    • Allow 2-3 business days for refills; controlled substances may take longer.   • Contact your pharmacy at least 5 days prior to running out of medication and have them send an electronic request or submit request through the “request re entire amount billed. Precertification and Prior Authorizations: If your physician has recommended that you have a procedure or additional testing performed.   [facility]) will contact your insurance carrier to obtain pre-certi [address] [facility], [address] [facility] En[address]sec[address], [address] [place]      Prior to the procedure:  Please update us prior to the procedure if you are experiencing any symptoms of infection such as fever, chills, cough, and urinary symptoms.         Medication before or after your procedure at  [phone]. If you are a diabetic, please increase the frequency of your glucose monitoring after the procedure as this may cause a temporary increase in your blood sugar.   Contact your primary care physician if your b

## 2018-11-01 ENCOUNTER — OFFICE VISIT (OUTPATIENT)
Dept: PAIN CLINIC | Facility: CLINIC | Age: 73
End: 2018-11-01
Payer: COMMERCIAL

## 2018-11-01 VITALS
WEIGHT: 142 LBS | BODY MASS INDEX: 23.66 KG/M2 | HEART RATE: 69 BPM | DIASTOLIC BLOOD PRESSURE: 68 MMHG | OXYGEN SATURATION: 95 % | SYSTOLIC BLOOD PRESSURE: 112 MMHG | HEIGHT: 65 IN

## 2018-11-01 DIAGNOSIS — G89.29 CHRONIC SCAPULAR PAIN: Primary | ICD-10-CM

## 2018-11-01 DIAGNOSIS — M89.8X1 CHRONIC SCAPULAR PAIN: Primary | ICD-10-CM

## 2018-11-01 PROCEDURE — 20553 NJX 1/MLT TRIGGER POINTS 3/>: CPT | Performed by: ANESTHESIOLOGY

## 2018-11-01 RX ORDER — METHYLPREDNISOLONE ACETATE 40 MG/ML
80 INJECTION, SUSPENSION INTRA-ARTICULAR; INTRALESIONAL; INTRAMUSCULAR; SOFT TISSUE ONCE
Status: COMPLETED | OUTPATIENT
Start: 2018-11-01 | End: 2018-11-01

## 2018-11-01 RX ORDER — BUPIVACAINE HYDROCHLORIDE 5 MG/ML
8 INJECTION, SOLUTION EPIDURAL; INTRACAUDAL ONCE
Status: COMPLETED | OUTPATIENT
Start: 2018-11-01 | End: 2018-11-01

## 2018-11-01 NOTE — PROCEDURES
Date of procedure: November 1, 2018    Preop diagnosis: Chronic right scapular pain    Postop diagnosis: Same    Procedure: Right scapular border trigger point injections    Surgeon: Mitzi Marquez    Anesthesia: Local    EBL: None    Indication: The patient

## 2018-11-13 NOTE — TELEPHONE ENCOUNTER
Thoracic and Cervical epideral steriod injection is under the same code , I obtained auth for this code. Is the patient planing to receive injection?

## 2019-04-10 PROBLEM — R05.9 COUGH: Status: ACTIVE | Noted: 2019-04-10

## 2019-04-26 ENCOUNTER — TELEPHONE (OUTPATIENT)
Dept: PAIN CLINIC | Facility: CLINIC | Age: 74
End: 2019-04-26

## 2019-04-26 ENCOUNTER — OFFICE VISIT (OUTPATIENT)
Dept: PAIN CLINIC | Facility: CLINIC | Age: 74
End: 2019-04-26
Payer: COMMERCIAL

## 2019-04-26 VITALS
HEIGHT: 65 IN | SYSTOLIC BLOOD PRESSURE: 132 MMHG | OXYGEN SATURATION: 97 % | DIASTOLIC BLOOD PRESSURE: 62 MMHG | WEIGHT: 139 LBS | BODY MASS INDEX: 23.16 KG/M2 | HEART RATE: 69 BPM

## 2019-04-26 DIAGNOSIS — G89.29 CHRONIC SCAPULAR PAIN: Primary | ICD-10-CM

## 2019-04-26 DIAGNOSIS — M89.8X1 CHRONIC SCAPULAR PAIN: Primary | ICD-10-CM

## 2019-04-26 PROCEDURE — 99213 OFFICE O/P EST LOW 20 MIN: CPT | Performed by: ANESTHESIOLOGY

## 2019-04-26 NOTE — PROGRESS NOTES
Name: Jaelyn Barton   : 1945   DOS: 2019     Pain Clinic Follow Up Visit:   Patient presents with: Follow - Up: injection discussion.  Intermittent pain in right and left shoulders      Jaelyn Barton is a 69year old female with a history of sc daily. Disp:  Rfl:    Resveratrol 100 MG Oral Cap Take 1 tablet by mouth daily. Disp:  Rfl:    VALERIAN ROOT OR Take 2.5 tablets by mouth nightly. Disp:  Rfl:    TURMERIC CURCUMIN OR Take 1 tablet by mouth 2 (two) times daily.  Disp:  Rfl:    Calcium Carbon 4/26/2019, 9:46 AM

## 2019-04-26 NOTE — TELEPHONE ENCOUNTER
Medical clearance needed- no    Pt seen in OV today by Dr. Savannah Henley and recommended for TPI in office (X 1). Please begin PA process for procedure(s).      Laterality: bilateral  Level(s): scapula    **Scheduled 5/2/19 at 130 pm in office**

## 2019-04-26 NOTE — PATIENT INSTRUCTIONS
Refill policies:    • Allow 2-3 business days for refills; controlled substances may take longer.   • Contact your pharmacy at least 5 days prior to running out of medication and have them send an electronic request or submit request through the “request re been approved by your insurer. Depending on your insurance carrier, approval may take 3-10 days. It is highly recommended patients contact their insurance carrier directly to determine coverage.   If test is done without insurance authorization, patient ma PROCEDURES    Appointment Date: 5/2/19  Appointment Time: 1:30 pm  Physician: Feliciano Welch    ** TO AVOID CANCELLATION AND/OR RESCHEDULING: PLEASE CALL [facility] PRE-PROCEDURE LINE -[phone] FOR DETAILED INSTRUCTIONS FIVE TO SEVEN DAYS PRIOR TO PROCEDURE**      Lo Appointment:   In the event you need to cancel or reschedule your appointment, you must notify the office 24 hours prior.     Post-procedure instructions:        Please schedule a follow up visit within 2 to 4 weeks after your last procedure date   Please c

## 2019-05-02 ENCOUNTER — OFFICE VISIT (OUTPATIENT)
Dept: PAIN CLINIC | Facility: CLINIC | Age: 74
End: 2019-05-02
Payer: COMMERCIAL

## 2019-05-02 VITALS
HEIGHT: 65 IN | SYSTOLIC BLOOD PRESSURE: 130 MMHG | WEIGHT: 139 LBS | HEART RATE: 70 BPM | DIASTOLIC BLOOD PRESSURE: 72 MMHG | BODY MASS INDEX: 23.16 KG/M2 | OXYGEN SATURATION: 98 %

## 2019-05-02 DIAGNOSIS — G89.29 CHRONIC SCAPULAR PAIN: ICD-10-CM

## 2019-05-02 DIAGNOSIS — M54.9 BACK PAIN, UNSPECIFIED BACK LOCATION, UNSPECIFIED BACK PAIN LATERALITY, UNSPECIFIED CHRONICITY: Primary | ICD-10-CM

## 2019-05-02 DIAGNOSIS — M89.8X1 CHRONIC SCAPULAR PAIN: ICD-10-CM

## 2019-05-02 DIAGNOSIS — S23.9XXA SPRAIN OF THORACIC REGION: ICD-10-CM

## 2019-05-02 PROCEDURE — 20553 NJX 1/MLT TRIGGER POINTS 3/>: CPT | Performed by: ANESTHESIOLOGY

## 2019-05-02 NOTE — PROGRESS NOTES
Pt denies fever, flu-like symptoms, cold symptoms, and rash anywhere on body for past 5 days. Pt states she has an asthma related cough, not concerned about infection. Timeout completed prior to procedure @ 1350.   Participants present for timeout:

## 2019-05-02 NOTE — TELEPHONE ENCOUNTER
Prior authorization request completed for: TPI   Authorization #[id number]  Authorization dates: 5/1/19  CPT codes approved: 89117  Number of visits/dates of service approved: 1  Physician: Kane Garza to Jonny Cruz at [facility] [place], [place] for injection u

## 2019-05-02 NOTE — PROCEDURES
Date of procedure: May 2, 2019    Preop diagnosis: Chronic scapular pain    Postop diagnosis: Same    Procedure: Bilateral scapular border trigger point injections    Surgeon: Brandon Banks    Anesthesia: Local    EBL: 0    Indication: The patient is a left-h

## 2019-05-03 ENCOUNTER — TELEPHONE (OUTPATIENT)
Dept: SURGERY | Facility: CLINIC | Age: 74
End: 2019-05-03

## 2019-05-03 RX ORDER — METHYLPREDNISOLONE ACETATE 40 MG/ML
80 INJECTION, SUSPENSION INTRA-ARTICULAR; INTRALESIONAL; INTRAMUSCULAR; SOFT TISSUE ONCE
Status: COMPLETED | OUTPATIENT
Start: 2019-05-03 | End: 2019-05-03

## 2019-05-03 RX ORDER — BUPIVACAINE HYDROCHLORIDE 5 MG/ML
18 INJECTION, SOLUTION PERINEURAL ONCE
Status: COMPLETED | OUTPATIENT
Start: 2019-05-03 | End: 2019-05-03

## 2019-05-03 NOTE — TELEPHONE ENCOUNTER
Contacted pt again to inform her that sx were discussed w/ Dr Matias Primrose and nausea is most likely not related to the steroid. Advised pt to try ibuprofen, naproxen, ice, or heat for pain mngmnt.  Pt inquired about using valerian root instead as she tries to [employer]

## 2019-05-03 NOTE — TELEPHONE ENCOUNTER
Pt states she is feeling nauseous, like the injection area \"is on fire. \" Pt states she took a 3 mile walk this morning. Pt states she felt sore prior to walk, but feels worse now.  Pt is scheduled to work later today, which will require 100% ability to li

## 2019-05-17 PROCEDURE — 82785 ASSAY OF IGE: CPT | Performed by: ALLERGY & IMMUNOLOGY

## 2019-05-17 PROCEDURE — 36415 COLL VENOUS BLD VENIPUNCTURE: CPT | Performed by: ALLERGY & IMMUNOLOGY

## 2019-05-17 PROCEDURE — 86003 ALLG SPEC IGE CRUDE XTRC EA: CPT | Performed by: ALLERGY & IMMUNOLOGY

## 2019-07-25 ENCOUNTER — OFFICE VISIT (OUTPATIENT)
Dept: FAMILY MEDICINE | Age: 74
End: 2019-07-25

## 2019-07-25 VITALS — HEART RATE: 66 BPM | DIASTOLIC BLOOD PRESSURE: 64 MMHG | SYSTOLIC BLOOD PRESSURE: 114 MMHG | TEMPERATURE: 98.3 F

## 2019-07-25 DIAGNOSIS — L08.9 SKIN INFECTION: Primary | ICD-10-CM

## 2019-07-25 DIAGNOSIS — L98.9 SKIN LESION: ICD-10-CM

## 2019-07-25 PROCEDURE — 99203 OFFICE O/P NEW LOW 30 MIN: CPT | Performed by: NURSE PRACTITIONER

## 2019-07-25 RX ORDER — CHLORAL HYDRATE 500 MG
2 CAPSULE ORAL DAILY
COMMUNITY

## 2019-07-25 RX ORDER — CETIRIZINE HYDROCHLORIDE 10 MG/1
10 TABLET ORAL DAILY
COMMUNITY

## 2019-07-25 RX ORDER — RANITIDINE 300 MG/1
300 TABLET ORAL NIGHTLY
COMMUNITY

## 2019-07-25 RX ORDER — AZELASTINE 1 MG/ML
1 SPRAY, METERED NASAL 2 TIMES DAILY
COMMUNITY

## 2019-07-25 RX ORDER — ASPIRIN 81 MG/1
81 TABLET, CHEWABLE ORAL DAILY
COMMUNITY

## 2019-07-25 RX ORDER — CEPHALEXIN 500 MG/1
500 CAPSULE ORAL 4 TIMES DAILY
Qty: 40 CAPSULE | Refills: 0 | Status: SHIPPED | OUTPATIENT
Start: 2019-07-25 | End: 2019-08-04

## 2019-07-25 RX ORDER — FLUTICASONE FUROATE AND VILANTEROL 100; 25 UG/1; UG/1
POWDER RESPIRATORY (INHALATION)
COMMUNITY

## 2019-07-25 RX ORDER — ALBUTEROL SULFATE 90 UG/1
2 AEROSOL, METERED RESPIRATORY (INHALATION) EVERY 4 HOURS PRN
COMMUNITY

## 2019-11-14 ENCOUNTER — TELEPHONE (OUTPATIENT)
Dept: SURGERY | Facility: CLINIC | Age: 74
End: 2019-11-14

## 2019-11-14 NOTE — TELEPHONE ENCOUNTER
Spoke to Roxane at [facility], provided information for upcoming office injection. Procedure codes 99350 & 94667 are valid and billable, no prior authorization or predetermination required at this time.  Call reference # [id number]   Duration of call: 4:51

## 2019-11-14 NOTE — TELEPHONE ENCOUNTER
Patient requesting to repeat TPI in office. Procedure scheduled, patient advised to hold Fish Oil for 5 days and ASA 81mg for 24 hours. Patient verbalized understanding, no further needs.       Joanna  PRE-PROCEDURE INSTRUCTIONS FOR FRANK HERBAL SUPPLEMENTS  5 days  Fish oil, krill oil, Omega-3, vitamin E, Turmeric, Garlic                            Insurance Authorization:   Most insurances are now requiring a preauthorization for all procedures.   In the event that your in

## 2019-11-19 ENCOUNTER — OFFICE VISIT (OUTPATIENT)
Dept: PAIN CLINIC | Facility: CLINIC | Age: 74
End: 2019-11-19
Payer: COMMERCIAL

## 2019-11-19 VITALS
SYSTOLIC BLOOD PRESSURE: 120 MMHG | HEART RATE: 75 BPM | HEIGHT: 64 IN | BODY MASS INDEX: 23.39 KG/M2 | OXYGEN SATURATION: 95 % | DIASTOLIC BLOOD PRESSURE: 60 MMHG | WEIGHT: 137 LBS

## 2019-11-19 DIAGNOSIS — M79.18 MYOFASCIAL PAIN: Primary | ICD-10-CM

## 2019-11-19 DIAGNOSIS — G89.29 CHRONIC SCAPULAR PAIN: ICD-10-CM

## 2019-11-19 DIAGNOSIS — M89.8X1 CHRONIC SCAPULAR PAIN: ICD-10-CM

## 2019-11-19 PROCEDURE — 20553 NJX 1/MLT TRIGGER POINTS 3/>: CPT | Performed by: ANESTHESIOLOGY

## 2019-11-19 PROCEDURE — 96372 THER/PROPH/DIAG INJ SC/IM: CPT | Performed by: ANESTHESIOLOGY

## 2019-11-19 PROCEDURE — S0020 INJECTION, BUPIVICAINE HYDRO: HCPCS | Performed by: ANESTHESIOLOGY

## 2019-11-19 RX ORDER — METHYLPREDNISOLONE ACETATE 40 MG/ML
40 INJECTION, SUSPENSION INTRA-ARTICULAR; INTRALESIONAL; INTRAMUSCULAR; SOFT TISSUE ONCE
Status: COMPLETED | OUTPATIENT
Start: 2019-11-19 | End: 2019-11-19

## 2019-11-19 RX ORDER — BUPIVACAINE HYDROCHLORIDE 5 MG/ML
9 INJECTION, SOLUTION PERINEURAL ONCE
Status: COMPLETED | OUTPATIENT
Start: 2019-11-19 | End: 2019-11-19

## 2019-11-19 NOTE — PROCEDURES
Date of procedure: November 19, 2019    Preop diagnosis: Chronic scapular pain    Postop diagnosis: Same    Procedure: Bilateral scapular border trigger point injections    Surgeon: Analia Haynes    Anesthesia: None    EBL: 0    Indication: The patient is a 7

## 2019-11-19 NOTE — PROGRESS NOTES
Timeout completed prior to procedure @ 2:05 PM .  Participants present for timeout:  Dr. Shawn Mayfield, and patient.

## 2019-11-19 NOTE — PROGRESS NOTES
Patient presents in office today with reported pain in mid back right side     Current pain level reported = 2/10

## 2020-06-15 ENCOUNTER — TELEPHONE (OUTPATIENT)
Dept: SURGERY | Facility: CLINIC | Age: 75
End: 2020-06-15

## 2020-06-15 NOTE — TELEPHONE ENCOUNTER
Pt wants to schedule Trigger Point Injection Scapular Border, last done 11/19/19, pls advise if pt needs f/u before scheduling inj in office.

## 2020-06-23 NOTE — TELEPHONE ENCOUNTER
Prior authorization request for Trigger Point Injection (83263, 75829) initiated via [facility] online, clinical notes uploaded, request pending review.      Pending reference #[id number]

## 2020-06-24 ENCOUNTER — OFFICE VISIT (OUTPATIENT)
Dept: PAIN CLINIC | Facility: CLINIC | Age: 75
End: 2020-06-24
Payer: COMMERCIAL

## 2020-06-24 VITALS — WEIGHT: 140 LBS | BODY MASS INDEX: 23.9 KG/M2 | HEIGHT: 64 IN

## 2020-06-24 DIAGNOSIS — M89.8X1 CHRONIC SCAPULAR PAIN: Primary | ICD-10-CM

## 2020-06-24 DIAGNOSIS — G89.29 CHRONIC SCAPULAR PAIN: Primary | ICD-10-CM

## 2020-06-24 PROCEDURE — 20552 NJX 1/MLT TRIGGER POINT 1/2: CPT | Performed by: ANESTHESIOLOGY

## 2020-06-24 RX ORDER — BUPIVACAINE HYDROCHLORIDE 5 MG/ML
18 INJECTION, SOLUTION EPIDURAL; INTRACAUDAL ONCE
Status: COMPLETED | OUTPATIENT
Start: 2020-06-24 | End: 2020-06-24

## 2020-06-24 RX ORDER — METHYLPREDNISOLONE ACETATE 40 MG/ML
80 INJECTION, SUSPENSION INTRA-ARTICULAR; INTRALESIONAL; INTRAMUSCULAR; SOFT TISSUE ONCE
Status: COMPLETED | OUTPATIENT
Start: 2020-06-24 | End: 2020-06-24

## 2020-06-24 RX ORDER — IPRATROPIUM BROMIDE 21 UG/1
SPRAY, METERED NASAL
COMMUNITY
Start: 2020-06-17 | End: 2020-12-30

## 2020-06-24 RX ORDER — FAMOTIDINE 40 MG/1
TABLET, FILM COATED ORAL
COMMUNITY
Start: 2020-05-12 | End: 2020-09-08

## 2020-06-24 NOTE — PROCEDURES
Date of procedure: June 24, 2020    Preop diagnosis: Chronic scapular pain    Postop diagnosis: Same    Procedure: Bilateral scapular border trigger point injections    Surgeon: Levie Dancer    Anesthesia: None    EBL: 0    Indication: The patient is a pleas

## 2020-06-24 NOTE — PROGRESS NOTES
Patient presents in office today with reported pain in upper back     Current pain level reported = 4/10

## 2020-06-24 NOTE — PROGRESS NOTES
Pt denies any recent procedures, illnesses, or rash on any part of body today. Timeout completed prior to procedure @ 1211. Participants present for timeout:  Dr. Lissette Patton, and patient.

## 2020-09-16 ENCOUNTER — OFFICE VISIT (OUTPATIENT)
Dept: PAIN CLINIC | Facility: CLINIC | Age: 75
End: 2020-09-16
Payer: MEDICARE

## 2020-09-16 VITALS — BODY MASS INDEX: 22.99 KG/M2 | HEIGHT: 65 IN | WEIGHT: 138 LBS

## 2020-09-16 DIAGNOSIS — M50.30 DDD (DEGENERATIVE DISC DISEASE), CERVICAL: Primary | ICD-10-CM

## 2020-09-16 PROCEDURE — 99213 OFFICE O/P EST LOW 20 MIN: CPT | Performed by: ANESTHESIOLOGY

## 2020-09-16 NOTE — PROGRESS NOTES
Name: Haylie Ervin   : 1945   DOS: 2020     Pain Clinic Follow Up Visit:   Patient presents with:  [facility] Care F/U      Haylie Ervin is a 70year old female with a history of cervical degenerative disc disease, who presents today for eval AND 1/2 TABLETS BY MOUTH AT BEDTIME AS NEEDED 135 tablet 1   • Rosuvastatin Calcium 10 MG Oral Tab TK 1 T PO QD     • Coenzyme Q10 (COQ10) 200 MG Oral Cap Take one daily to prevent muscle aches 90 capsule 3   • hydrocortisone 2.5 % External Cream Apply to

## 2021-01-18 PROBLEM — R05.3 CHRONIC COUGH: Status: ACTIVE | Noted: 2019-04-10

## 2021-01-18 PROBLEM — Z86.16 HISTORY OF 2019 NOVEL CORONAVIRUS DISEASE (COVID-19): Status: ACTIVE | Noted: 2021-01-18

## 2021-01-19 PROBLEM — J44.9 CHRONIC OBSTRUCTIVE ASTHMA (HCC): Status: ACTIVE | Noted: 2021-01-19

## 2021-02-02 PROBLEM — D41.11: Status: ACTIVE | Noted: 2021-02-02

## 2021-02-02 PROCEDURE — 88108 CYTOPATH CONCENTRATE TECH: CPT | Performed by: UROLOGY

## 2021-03-25 PROBLEM — A60.00 RECURRENT GENITAL HERPES SIMPLEX TYPE 2 INFECTION: Status: ACTIVE | Noted: 2021-03-25

## 2021-04-20 PROBLEM — D41.11: Status: RESOLVED | Noted: 2021-02-02 | Resolved: 2021-04-20

## 2021-06-09 ENCOUNTER — OFFICE VISIT (OUTPATIENT)
Dept: PAIN CLINIC | Facility: CLINIC | Age: 76
End: 2021-06-09
Payer: MEDICARE

## 2021-06-09 ENCOUNTER — TELEPHONE (OUTPATIENT)
Dept: NEUROLOGY | Facility: CLINIC | Age: 76
End: 2021-06-09

## 2021-06-09 ENCOUNTER — TELEPHONE (OUTPATIENT)
Dept: SURGERY | Facility: CLINIC | Age: 76
End: 2021-06-09

## 2021-06-09 VITALS
RESPIRATION RATE: 16 BRPM | HEART RATE: 75 BPM | WEIGHT: 138 LBS | SYSTOLIC BLOOD PRESSURE: 118 MMHG | OXYGEN SATURATION: 98 % | HEIGHT: 64 IN | BODY MASS INDEX: 23.56 KG/M2 | DIASTOLIC BLOOD PRESSURE: 70 MMHG

## 2021-06-09 DIAGNOSIS — M54.12 CERVICAL RADICULOPATHY: Primary | ICD-10-CM

## 2021-06-09 DIAGNOSIS — M50.223 HERNIATED NUCLEUS PULPOSUS, C6-7: Primary | ICD-10-CM

## 2021-06-09 DIAGNOSIS — M50.30 DDD (DEGENERATIVE DISC DISEASE), CERVICAL: ICD-10-CM

## 2021-06-09 PROCEDURE — 99214 OFFICE O/P EST MOD 30 MIN: CPT | Performed by: ANESTHESIOLOGY

## 2021-06-09 PROCEDURE — 3074F SYST BP LT 130 MM HG: CPT | Performed by: ANESTHESIOLOGY

## 2021-06-09 PROCEDURE — 3008F BODY MASS INDEX DOCD: CPT | Performed by: ANESTHESIOLOGY

## 2021-06-09 PROCEDURE — 3078F DIAST BP <80 MM HG: CPT | Performed by: ANESTHESIOLOGY

## 2021-06-09 RX ORDER — DIAZEPAM 10 MG/1
10 TABLET ORAL EVERY 12 HOURS PRN
Qty: 2 TABLET | Refills: 0 | Status: SHIPPED | OUTPATIENT
Start: 2021-06-09 | End: 2021-07-19

## 2021-06-09 NOTE — TELEPHONE ENCOUNTER
Completed [employer] Form for NEFTALI (10628) Attached Clinical Information faxed to [address] received.

## 2021-06-09 NOTE — PROGRESS NOTES
Name: Yolanda Craft   : 1945   DOS: 2021     Pain Clinic Follow Up Visit:   Patient presents with: Follow - Up: discuss and schedule cortisone injection in neck.  advised pt to obtain copy of MRI done at [facility]      Yolanda Craft is a 78 ye Take 7.5 mg by mouth daily. • hydrocortisone 2.5 % External Cream Apply once to twice a day to the corners of the mouth.  30 g 1   • ketoconazole 2 % External Cream Apply BID x 2 weeks to corners of the mouth and then use 2-3 times a week for [employer] tenderness to lateral rotation   upper extremity: Range of motion intact. Dexterity intact  Skin: Warm, dry  Respiratory: Nonlabored  Back: Gait intact    IMAGES:   CT cervical spine reviewed.   There is evidence of joint space narrowing with posterior ost

## 2021-06-09 NOTE — PROGRESS NOTES
Patient presents in office today with reported pain in neck and left arm is numb    Current pain level reported = 2/10

## 2021-06-09 NOTE — TELEPHONE ENCOUNTER
Prior authorization request completed for: NEFTALI   Authorization #[id number]    Authorization dates: 06/10/21 - 07/25/21  CPT codes approved: 59830  Number of visits/dates of service approved: 1  Physician: Sarah Lucio   Location: [facility]

## 2021-06-10 NOTE — TELEPHONE ENCOUNTER
[address]  PRE-PROCEDURE INSTRUCTIONS WITHOUT SEDATION    Procedure- NEFTALI   Appointment Date: 6/15/2021  Check-In Time: 9:30 AM     Valium sent to pharmacy      ** TO AVOID CANCELLATION AND/OR RESCHEDULING: PLEASE CALL [facility] PRE-PROCEDURE L hours  • Aspirin  • 81mg               24 hours  • Greater than 81mg but less than 325mg   5 days  • 325mg and greater                  7 days  • Coumadin       5 days  • Procedure may be cancelled if INR is elevated.    • Excedrin (with aspirin) 7 days  • ice (20 minutes on 20 minutes off) for comfort.       ** TO AVOID CANCELLATION AND/OR RESCHEDULING: PLEASE CALL [facility] PRE-PROCEDURE LINE -[phone] FOR DETAILED INSTRUCTIONS FIVE TO SEVEN DAYS PRIOR TO PROCEDURE**

## 2021-06-10 NOTE — TELEPHONE ENCOUNTER
Anesthesia Type: Valium  Provider: Imelda Kemp  Location: Lab  Procedure: Cervical CLAYTON  Medical clearance requested (will send to Pain Navigator): No  Patient has Medicare coverage?  No  Comments (Please list entire procedure name here.): cervical epidural steroid

## 2021-06-14 NOTE — TELEPHONE ENCOUNTER
Patient called back to review pre procedure instructions . Answered all questions and reiterated patient will need a  since she is taking Valium for procedure. She v/u.  Patient asked if she should resume adjustments with chiropractor after injections

## 2021-06-15 ENCOUNTER — HOSPITAL ENCOUNTER (OUTPATIENT)
Facility: HOSPITAL | Age: 76
Setting detail: HOSPITAL OUTPATIENT SURGERY
Discharge: HOME | End: 2021-06-15
Attending: ANESTHESIOLOGY | Admitting: ANESTHESIOLOGY
Payer: MEDICARE

## 2021-06-15 ENCOUNTER — APPOINTMENT (OUTPATIENT)
Dept: GENERAL RADIOLOGY | Facility: HOSPITAL | Age: 76
End: 2021-06-15
Attending: ANESTHESIOLOGY
Payer: MEDICARE

## 2021-06-15 VITALS
DIASTOLIC BLOOD PRESSURE: 72 MMHG | HEART RATE: 70 BPM | RESPIRATION RATE: 18 BRPM | TEMPERATURE: 98 F | SYSTOLIC BLOOD PRESSURE: 129 MMHG | OXYGEN SATURATION: 99 %

## 2021-06-15 DIAGNOSIS — M54.12 CERVICAL RADICULOPATHY: ICD-10-CM

## 2021-06-15 PROCEDURE — 3E0R33Z INTRODUCTION OF ANTI-INFLAMMATORY INTO SPINAL CANAL, PERCUTANEOUS APPROACH: ICD-10-PCS | Performed by: ANESTHESIOLOGY

## 2021-06-15 RX ORDER — ONDANSETRON 2 MG/ML
4 INJECTION INTRAMUSCULAR; INTRAVENOUS ONCE AS NEEDED
Status: DISCONTINUED | OUTPATIENT
Start: 2021-06-15 | End: 2021-06-15

## 2021-06-15 RX ORDER — LIDOCAINE HYDROCHLORIDE 10 MG/ML
INJECTION, SOLUTION EPIDURAL; INFILTRATION; INTRACAUDAL; PERINEURAL
Status: DISCONTINUED | OUTPATIENT
Start: 2021-06-15 | End: 2021-06-15

## 2021-06-15 RX ORDER — SODIUM CHLORIDE 9 MG/ML
INJECTION INTRAVENOUS
Status: DISCONTINUED | OUTPATIENT
Start: 2021-06-15 | End: 2021-06-15

## 2021-06-15 RX ORDER — DEXAMETHASONE SODIUM PHOSPHATE 10 MG/ML
INJECTION, SOLUTION INTRAMUSCULAR; INTRAVENOUS
Status: DISCONTINUED | OUTPATIENT
Start: 2021-06-15 | End: 2021-06-15

## 2021-06-15 RX ORDER — SODIUM CHLORIDE, SODIUM LACTATE, POTASSIUM CHLORIDE, CALCIUM CHLORIDE 600; 310; 30; 20 MG/100ML; MG/100ML; MG/100ML; MG/100ML
100 INJECTION, SOLUTION INTRAVENOUS CONTINUOUS
Status: DISCONTINUED | OUTPATIENT
Start: 2021-06-15 | End: 2021-06-15

## 2021-06-15 RX ORDER — DIPHENHYDRAMINE HYDROCHLORIDE 50 MG/ML
50 INJECTION INTRAMUSCULAR; INTRAVENOUS ONCE AS NEEDED
Status: DISCONTINUED | OUTPATIENT
Start: 2021-06-15 | End: 2021-06-15

## 2021-06-15 NOTE — OR PREOP
Dr. [facility] informed that patient had 10mg of valium at 0919 and wanted to repeat 10mg at 1000. Dr. [facility] verbal instructions to tell patient not to take valium.

## 2021-06-15 NOTE — H&P
History & Physical Examination    Patient Name: Naomi Smoker  MRN: [id number]  CSN: [id number]  YOB: 1945    Pre-Operative Diagnosis:  Cervical radiculopathy [M54.12]    Present Illness: Patient with cervical radiculopathy for epidural steroid Disp: 30 g, Rfl: 1  ketoconazole 2 % External Cream, Apply BID x 2 weeks to corners of the mouth and then use 2-3 times a week for maintenance, Disp: 60 g, Rfl: 3  ALPRAZolam 0.5 MG Oral Tab, TAKE 1 AND 1/2 TABLETS BY MOUTH AT BEDTIME AS NEEDED, Disp: 135 History of 2019 novel coronavirus disease (COVID-19) 1/18/2021   • History of colonic diverticulitis 8/21/2012   • History of total hip replacement, bilateral 10/27/2016   • Hyperkalemia 3/15/2016   • Insomnia, unspecified type    • Microscopic hematuria 8 [address]   • SPECIAL SERVICE OR REPORT      vein laser surgery   • UPPER GI ENDOSCOPY - REFERRAL  7/2012    EGD w/ small hiatal hernia and irregular z-line but bx negative for barretts   • UPPER GI ENDOSCOPY,BIOPSY  7/6/2012    Procedure: Brooke Parker

## 2021-06-15 NOTE — OPERATIVE REPORT
[facility]  Operative Report  6/15/2021     Dellar Shadow Patient Status:  Hospital Outpatient Surgery    1945 MRN [id number]   Location [address] [place] [address] Attending Chrissie Juarez MD   Hosp Day # 0 PCP Levy Kirkland MD recovered and was discharged to a responsible adult after discharge criteria were met. Complications: None. Follow up: The patient was followed in the pain clinic as needed basis.           Edward Delaney MD

## 2021-07-07 ENCOUNTER — OFFICE VISIT (OUTPATIENT)
Dept: PAIN CLINIC | Facility: CLINIC | Age: 76
End: 2021-07-07
Payer: MEDICARE

## 2021-07-07 ENCOUNTER — TELEPHONE (OUTPATIENT)
Dept: NEUROLOGY | Facility: CLINIC | Age: 76
End: 2021-07-07

## 2021-07-07 VITALS
DIASTOLIC BLOOD PRESSURE: 62 MMHG | SYSTOLIC BLOOD PRESSURE: 110 MMHG | OXYGEN SATURATION: 98 % | HEART RATE: 88 BPM | RESPIRATION RATE: 16 BRPM

## 2021-07-07 DIAGNOSIS — M89.8X1 CHRONIC SCAPULAR PAIN: Primary | ICD-10-CM

## 2021-07-07 DIAGNOSIS — G89.29 CHRONIC SCAPULAR PAIN: Primary | ICD-10-CM

## 2021-07-07 DIAGNOSIS — M50.30 DDD (DEGENERATIVE DISC DISEASE), CERVICAL: ICD-10-CM

## 2021-07-07 PROCEDURE — 3078F DIAST BP <80 MM HG: CPT | Performed by: ANESTHESIOLOGY

## 2021-07-07 PROCEDURE — 3074F SYST BP LT 130 MM HG: CPT | Performed by: ANESTHESIOLOGY

## 2021-07-07 PROCEDURE — 99214 OFFICE O/P EST MOD 30 MIN: CPT | Performed by: ANESTHESIOLOGY

## 2021-07-07 NOTE — PROGRESS NOTES
Name: Ana Laura Murray   : 1945   DOS: 2021     Pain Clinic Follow Up Visit:   Patient presents with:   Follow - Up: injection       Ana Laura Murray is a 78year old female with a history of cervical degenerative disc disease and radiculopathy followi Take 1 tablet (150 mg total) by mouth daily. 90 tablet 3   • Meloxicam 7.5 MG Oral Tab Take 7.5 mg by mouth daily. • hydrocortisone 2.5 % External Cream Apply once to twice a day to the corners of the mouth.  30 g 1   • ketoconazole 2 % External Cream A Ambulates with well-coordinated, fluid, non-antalgic gait. Gait is normal.  Neck: Full range of motion noted. Injection site is clear.    Upper extremity:  strength intact   Skin: Warm, dry   back: Gait intact    IMAGES:     No new imaging    ASSESSMEN

## 2021-07-07 NOTE — TELEPHONE ENCOUNTER
Prior authorization request completed for: Bilateral Thoracic TPI In Office   Authorization #No Prior Authorization/Nor Referral is Required   -Patient has PPO Plan   Spoke with Raymundo Lincoln at [address] [facility]  Reference #:[id number]   KKME:26:48    DIANE

## 2021-07-07 NOTE — PROGRESS NOTES
Last procedure: CERVICAL EPIDURAL STEROID INJECTION WITHOUT SEDATION  Date: 06/15/21  Percentage of relief obtained: 70%  Duration of relief: still experiencing relief    Current Pain Score: 0

## 2021-07-07 NOTE — TELEPHONE ENCOUNTER
Patient advised of insurance approval to proceed with injections and is agreeable to scheduling. Patient scheduled for procedure, pre-procedure instructions reviewed. Patient advised to hold Turmeric for 5 days and ASA 81mg for 24 hours prior to procedure. days), Oxaprozin (Daypro), Diclofenac (Voltaren), Indomethacin (Indocin), Etodolac (Lodine), Nabumetone (Relafen), Celebrex (Celecoxib)                         HERBAL SUPPLEMENTS  5 days  Fish oil, krill oil, Omega-3, vitamin E, Turmeric, Garlic

## 2021-07-07 NOTE — TELEPHONE ENCOUNTER
Question Answer Comment   Anesthesia Type Local    Provider [facility]    Medical clearance requested (will send to Pain Navigator) No    Patient has Medicare coverage?  No    Comments (Please list entire procedure name here.) bilateral thoraci

## 2021-07-19 ENCOUNTER — TELEPHONE (OUTPATIENT)
Dept: PAIN CLINIC | Facility: CLINIC | Age: 76
End: 2021-07-19

## 2021-07-19 PROBLEM — M17.0 PRIMARY OSTEOARTHRITIS OF BOTH KNEES: Status: ACTIVE | Noted: 2021-07-19

## 2021-07-19 NOTE — TELEPHONE ENCOUNTER
Patient scheduled for a Bilateral Thoracic TPI with Dr Sean Amos 7/23/2021    Discussed with Dr Sean Amos- Patient has had a lot of steroids and will need to push Bilateral TPI inj to early August.     Spoke with patient.  Patient had right and left knee joint inj wi

## 2021-07-19 NOTE — TELEPHONE ENCOUNTER
Pt is calling to relay information regarding  cortisone shots- 1 in the rt knee, 1 in the lft knee completed on 07/15/21. Pt is concerned that Dr. Arnulfo Harmon will not go through with her TPI on the 07/23/21 because of the amount of cortisone in her body now.  Pt

## 2021-08-13 ENCOUNTER — OFFICE VISIT (OUTPATIENT)
Dept: PAIN CLINIC | Facility: CLINIC | Age: 76
End: 2021-08-13
Payer: MEDICARE

## 2021-08-13 VITALS
BODY MASS INDEX: 24 KG/M2 | SYSTOLIC BLOOD PRESSURE: 112 MMHG | WEIGHT: 138 LBS | DIASTOLIC BLOOD PRESSURE: 58 MMHG | RESPIRATION RATE: 18 BRPM | HEART RATE: 79 BPM | OXYGEN SATURATION: 97 %

## 2021-08-13 DIAGNOSIS — M89.8X1 CHRONIC SCAPULAR PAIN: ICD-10-CM

## 2021-08-13 DIAGNOSIS — G89.29 CHRONIC SCAPULAR PAIN: ICD-10-CM

## 2021-08-13 DIAGNOSIS — M79.18 MYOFASCIAL PAIN: Primary | ICD-10-CM

## 2021-08-13 PROCEDURE — 3078F DIAST BP <80 MM HG: CPT | Performed by: ANESTHESIOLOGY

## 2021-08-13 PROCEDURE — 20553 NJX 1/MLT TRIGGER POINTS 3/>: CPT | Performed by: ANESTHESIOLOGY

## 2021-08-13 PROCEDURE — 3074F SYST BP LT 130 MM HG: CPT | Performed by: ANESTHESIOLOGY

## 2021-08-13 PROCEDURE — S0020 INJECTION, BUPIVICAINE HYDRO: HCPCS | Performed by: ANESTHESIOLOGY

## 2021-08-13 RX ORDER — BUPIVACAINE HYDROCHLORIDE 5 MG/ML
9 INJECTION, SOLUTION EPIDURAL; INTRACAUDAL ONCE
Status: COMPLETED | OUTPATIENT
Start: 2021-08-13 | End: 2021-08-13

## 2021-08-13 RX ORDER — LIDOCAINE HYDROCHLORIDE 10 MG/ML
9 INJECTION, SOLUTION INFILTRATION; PERINEURAL ONCE
Status: COMPLETED | OUTPATIENT
Start: 2021-08-13 | End: 2021-08-13

## 2021-08-13 RX ORDER — METHYLPREDNISOLONE ACETATE 40 MG/ML
40 INJECTION, SUSPENSION INTRA-ARTICULAR; INTRALESIONAL; INTRAMUSCULAR; SOFT TISSUE ONCE
Status: COMPLETED | OUTPATIENT
Start: 2021-08-13 | End: 2021-08-13

## 2021-08-13 NOTE — PROGRESS NOTES
Timeout completed prior to procedure @ 2345. Participants present for timeout: Dr. Jazmín Doty,  and patient.

## 2021-08-13 NOTE — PROCEDURES
Date of procedure: August 13, 2021    Preop diagnosis: Bilateral chronic scapular pain    Postop diagnosis: Same    Procedure: Bilateral scapular trigger point injections    Surgeon: Delilah Miles    Anesthesia: None    EBL: 0    Indication: The patient is a

## 2021-08-13 NOTE — PROGRESS NOTES
Patient presents in office today with reported pain in bilateral upper back    Current pain level reported = 2/10    Last reported dose of n/a      Narcotic Contract renewal     Urine Drug screen

## 2021-09-16 ENCOUNTER — TELEPHONE (OUTPATIENT)
Dept: PAIN CLINIC | Facility: CLINIC | Age: 76
End: 2021-09-16

## 2021-09-16 NOTE — TELEPHONE ENCOUNTER
Medical records request faxed from [facility], Teressa. Edvin. Requesting records from 03/25/2021-present.     Please mail or fax records to the below location:    Address: [address] [place] [address])[address]    FX: [phone]

## 2021-10-25 PROBLEM — I27.20 PULMONARY HTN (HCC): Status: ACTIVE | Noted: 2021-10-25

## 2021-11-08 ENCOUNTER — TELEPHONE (OUTPATIENT)
Dept: PAIN CLINIC | Facility: CLINIC | Age: 76
End: 2021-11-08

## 2021-11-08 NOTE — TELEPHONE ENCOUNTER
Contacted pt to relay info below. Pt states she had injections with Dr Ирина Yin and is experiencing some tingling down the arm, but pain has diminished.  Pt states she did discuss repeat injections at last visit, but she was hesitant because she had already had

## 2021-11-08 NOTE — TELEPHONE ENCOUNTER
Shirin Wilson MD  You 14 minutes ago (2:17 PM)         Patient is only managed with trigger point injections. No contraindication to knee surgery.  Her surgeon will need to manage her post op pain    Message text

## 2021-11-08 NOTE — TELEPHONE ENCOUNTER
Per pt she was instructed by Dr. Vicente Arora office to call Dr. Marie Fallon office to inform us she is having knee surgery on Friday. Pt see's Dr. Alicia Coelho for her neck. Please advise.

## 2021-11-23 PROBLEM — J43.8 OTHER EMPHYSEMA (HCC): Status: ACTIVE | Noted: 2021-11-23

## 2021-11-23 PROBLEM — I70.0 AORTIC ATHEROSCLEROSIS (HCC): Status: ACTIVE | Noted: 2021-11-23

## 2021-11-23 PROBLEM — G31.9 BRAIN ATROPHY (HCC): Status: ACTIVE | Noted: 2021-11-23

## 2022-01-17 PROBLEM — E22.2 SIADH (SYNDROME OF INAPPROPRIATE ADH PRODUCTION) (HCC): Status: ACTIVE | Noted: 2022-01-17

## 2022-01-20 PROBLEM — R05.3 CHRONIC COUGH: Status: RESOLVED | Noted: 2019-04-10 | Resolved: 2022-01-20

## 2022-01-20 PROBLEM — M17.0 PRIMARY OSTEOARTHRITIS OF BOTH KNEES: Status: RESOLVED | Noted: 2021-07-19 | Resolved: 2022-01-20

## 2022-01-20 PROBLEM — Z96.652 S/P TKR (TOTAL KNEE REPLACEMENT), LEFT: Status: ACTIVE | Noted: 2022-01-20

## 2022-01-20 PROBLEM — M17.11 ARTHRITIS OF RIGHT KNEE: Status: ACTIVE | Noted: 2022-01-20

## 2022-01-23 PROBLEM — Z95.1 S/P CABG X 1: Status: ACTIVE | Noted: 2022-01-23

## 2022-03-01 PROBLEM — R19.5 POSITIVE COLORECTAL CANCER SCREENING USING COLOGUARD TEST: Status: ACTIVE | Noted: 2022-03-01

## 2022-03-28 ENCOUNTER — OFFICE VISIT (OUTPATIENT)
Dept: PAIN CLINIC | Facility: CLINIC | Age: 77
End: 2022-03-28
Payer: MEDICARE

## 2022-03-28 VITALS — SYSTOLIC BLOOD PRESSURE: 120 MMHG | OXYGEN SATURATION: 98 % | DIASTOLIC BLOOD PRESSURE: 70 MMHG | HEART RATE: 76 BPM

## 2022-03-28 DIAGNOSIS — M50.223 HERNIATED NUCLEUS PULPOSUS, C6-7: Primary | ICD-10-CM

## 2022-03-28 DIAGNOSIS — M89.8X1 CHRONIC SCAPULAR PAIN: ICD-10-CM

## 2022-03-28 DIAGNOSIS — G89.29 CHRONIC SCAPULAR PAIN: ICD-10-CM

## 2022-03-28 PROCEDURE — 3078F DIAST BP <80 MM HG: CPT | Performed by: ANESTHESIOLOGY

## 2022-03-28 PROCEDURE — 99214 OFFICE O/P EST MOD 30 MIN: CPT | Performed by: ANESTHESIOLOGY

## 2022-03-28 PROCEDURE — 3074F SYST BP LT 130 MM HG: CPT | Performed by: ANESTHESIOLOGY

## 2022-03-28 RX ORDER — OFLOXACIN 3 MG/ML
SOLUTION/ DROPS OPHTHALMIC
COMMUNITY
Start: 2022-03-04

## 2022-03-28 RX ORDER — PREDNISOLONE ACETATE 10 MG/ML
SUSPENSION/ DROPS OPHTHALMIC
COMMUNITY
Start: 2022-03-04

## 2022-03-28 RX ORDER — KETOROLAC TROMETHAMINE 5 MG/ML
SOLUTION OPHTHALMIC
COMMUNITY
Start: 2022-03-04

## 2022-03-28 NOTE — PROGRESS NOTES
Patient presents in office today with reported pain in neck radiating into right side of back and right arm. She has electrical shocks down right arm.      Current pain level reported = 3-5/10    Last reported dose of nothing      Narcotic Contract renewal na    Urine Drug screen na

## 2022-03-29 ENCOUNTER — TELEPHONE (OUTPATIENT)
Dept: NEUROLOGY | Facility: CLINIC | Age: 77
End: 2022-03-29

## 2022-03-29 ENCOUNTER — LAB REQUISITION (OUTPATIENT)
Dept: SURGERY | Age: 77
End: 2022-03-29
Payer: MEDICARE

## 2022-03-29 NOTE — TELEPHONE ENCOUNTER
Prior authorization request completed for: NEFTALI   Authorization # [id number]  Authorization dates: 3/29/2022 - 5/28/2022   CPT codes approved: 77738  Number of visits/dates of service approved: 1  Physician: Dr. Brent Isaacs   Location: [facility] to schedule

## 2022-03-29 NOTE — TELEPHONE ENCOUNTER
Prior authorization request completed for: Bilateral TPI    Authorization # NO PRIOR AUTHORIZATION REQUIRED  Authorization dates: N/A  CPT codes approved: 11091 / 21038  Number of visits/dates of service approved: 1  Physician: Dr. Princess Quintero   Location: [facility] to schedule      [facility] and spoke with Sara / Ref # [id number]

## 2022-03-30 LAB — SARS-COV-2 RNA RESP QL NAA+PROBE: NOT DETECTED

## 2022-03-31 NOTE — TELEPHONE ENCOUNTER
Question Answer   Anesthesia Type Local   Provider [facility]   Medical clearance requested (will send to Pain Navigator) No   Patient has Medicare coverage? No   Comments (Please list entire procedure name here.) bilateral scapular trigger point injections     Question Answer   Anesthesia Type Valium   Provider Brent Allegra   Location Lab   Procedure Cervical CLAYTON   Medical clearance requested (will send to Pain Navigator) No   Patient has Medicare coverage?  No   Comments (Please list entire procedure name here.) cervical epidural steroid injection

## 2022-04-21 ENCOUNTER — HOSPITAL ENCOUNTER (OUTPATIENT)
Facility: HOSPITAL | Age: 77
Setting detail: HOSPITAL OUTPATIENT SURGERY
Discharge: HOME | End: 2022-04-21
Attending: ANESTHESIOLOGY | Admitting: ANESTHESIOLOGY
Payer: MEDICARE

## 2022-04-21 ENCOUNTER — APPOINTMENT (OUTPATIENT)
Dept: GENERAL RADIOLOGY | Facility: HOSPITAL | Age: 77
End: 2022-04-21
Attending: ANESTHESIOLOGY
Payer: MEDICARE

## 2022-04-21 VITALS
BODY MASS INDEX: 21.85 KG/M2 | DIASTOLIC BLOOD PRESSURE: 68 MMHG | HEART RATE: 77 BPM | HEIGHT: 64 IN | OXYGEN SATURATION: 98 % | WEIGHT: 128 LBS | SYSTOLIC BLOOD PRESSURE: 124 MMHG | RESPIRATION RATE: 16 BRPM | TEMPERATURE: 98 F

## 2022-04-21 DIAGNOSIS — M54.12 CERVICAL RADICULOPATHY: ICD-10-CM

## 2022-04-21 PROCEDURE — 3E0S3BZ INTRODUCTION OF ANESTHETIC AGENT INTO EPIDURAL SPACE, PERCUTANEOUS APPROACH: ICD-10-PCS | Performed by: ANESTHESIOLOGY

## 2022-04-21 PROCEDURE — B01B1ZZ FLUOROSCOPY OF SPINAL CORD USING LOW OSMOLAR CONTRAST: ICD-10-PCS | Performed by: ANESTHESIOLOGY

## 2022-04-21 PROCEDURE — 3E0S33Z INTRODUCTION OF ANTI-INFLAMMATORY INTO EPIDURAL SPACE, PERCUTANEOUS APPROACH: ICD-10-PCS | Performed by: ANESTHESIOLOGY

## 2022-04-21 RX ORDER — DIPHENHYDRAMINE HYDROCHLORIDE 50 MG/ML
50 INJECTION INTRAMUSCULAR; INTRAVENOUS ONCE AS NEEDED
Status: DISCONTINUED | OUTPATIENT
Start: 2022-04-21 | End: 2022-04-21

## 2022-04-21 RX ORDER — SODIUM CHLORIDE 9 MG/ML
INJECTION INTRAVENOUS
Status: DISCONTINUED | OUTPATIENT
Start: 2022-04-21 | End: 2022-04-21

## 2022-04-21 RX ORDER — DEXAMETHASONE SODIUM PHOSPHATE 10 MG/ML
INJECTION, SOLUTION INTRAMUSCULAR; INTRAVENOUS
Status: DISCONTINUED | OUTPATIENT
Start: 2022-04-21 | End: 2022-04-21

## 2022-04-21 RX ORDER — DIAZEPAM 5 MG/1
5 TABLET ORAL ONCE
Status: COMPLETED | OUTPATIENT
Start: 2022-04-21 | End: 2022-04-21

## 2022-04-21 RX ORDER — ONDANSETRON 2 MG/ML
4 INJECTION INTRAMUSCULAR; INTRAVENOUS ONCE AS NEEDED
Status: DISCONTINUED | OUTPATIENT
Start: 2022-04-21 | End: 2022-04-21

## 2022-04-21 RX ORDER — SODIUM CHLORIDE, SODIUM LACTATE, POTASSIUM CHLORIDE, CALCIUM CHLORIDE 600; 310; 30; 20 MG/100ML; MG/100ML; MG/100ML; MG/100ML
100 INJECTION, SOLUTION INTRAVENOUS CONTINUOUS
Status: DISCONTINUED | OUTPATIENT
Start: 2022-04-21 | End: 2022-04-21

## 2022-04-21 RX ORDER — LIDOCAINE HYDROCHLORIDE 10 MG/ML
INJECTION, SOLUTION EPIDURAL; INFILTRATION; INTRACAUDAL; PERINEURAL
Status: DISCONTINUED | OUTPATIENT
Start: 2022-04-21 | End: 2022-04-21

## 2022-04-21 RX ORDER — TURMERIC 400 MG
CAPSULE ORAL
COMMUNITY

## 2022-04-21 NOTE — OPERATIVE REPORT
[facility]  Operative Report  2022     Tahir Dawn Patient Status:  Hospital Outpatient Surgery    1945 MRN [id number]   Location [address] [place] [address] Attending Keshia Meeks MD   Hosp Day # 0 PCP Niurka Cunha MD     Indication: Antony Hooper is a 78year old female with cervical radiculopathy    Preoperative Diagnosis:  Cervical radiculopathy [M54.12]    Postoperative Diagnosis: Same as above. Procedure performed: CERVICAL EPIDURAL STEROID INJECTION with Valium     Anesthesia: Local .    EBL: Less than 1 ml. Procedure Description:  After reviewing the patient's history and performing a focused physical examination, the diagnosis was confirmed and contraindications such as infection and coagulopathy were ruled out. Following review of allergies, potential side effects, and complications, including but not necessarily limited to infection, allergic reaction, local tissue breakdown, nerve injury, post-dural puncture headache and paresis, the patient indicated they understood and agreed to proceed. After obtaining the informed consent, the patient was brought to the procedure room and monitored. The patient was brought to the procedure room and positioned prone. After comprehensive monitors were applied, the patient's neck was prepped and draped sterilely. After local anesthetic was instilled in the skin and subcutaneous tissue, a 20-gauge Tuohy needle was introduced and advanced under fluoroscopy at C7-T1. The epidural space was reached by using a loss of resistance to air technique. There was no C. S.F. or blood through the needle. After obtaining a good epidurogram by injecting Omnipaque 180 1 mL, a combination of normal saline and dexamethasone 10 mg in total volume of 4 mL was injected. The needle was then flushed with normal saline 1 mL. The stylet re-applied. The needle was withdrawn with the tip intact.   The patient tolerated the procedure very well and recovered and was discharged to a responsible adult after discharge criteria were met. Complications: None. Follow up: The patient was followed in the pain clinic as needed basis.           Susan Gonzalez MD

## 2022-04-29 ENCOUNTER — OFFICE VISIT (OUTPATIENT)
Dept: PAIN CLINIC | Facility: CLINIC | Age: 77
End: 2022-04-29
Payer: MEDICARE

## 2022-04-29 VITALS
SYSTOLIC BLOOD PRESSURE: 128 MMHG | OXYGEN SATURATION: 98 % | BODY MASS INDEX: 22 KG/M2 | HEART RATE: 78 BPM | DIASTOLIC BLOOD PRESSURE: 82 MMHG | WEIGHT: 128 LBS

## 2022-04-29 DIAGNOSIS — M89.8X1 CHRONIC SCAPULAR PAIN: Primary | ICD-10-CM

## 2022-04-29 DIAGNOSIS — M79.18 MYOFASCIAL PAIN: ICD-10-CM

## 2022-04-29 DIAGNOSIS — G89.29 CHRONIC SCAPULAR PAIN: Primary | ICD-10-CM

## 2022-04-29 PROCEDURE — 20553 NJX 1/MLT TRIGGER POINTS 3/>: CPT | Performed by: ANESTHESIOLOGY

## 2022-04-29 PROCEDURE — 3079F DIAST BP 80-89 MM HG: CPT | Performed by: ANESTHESIOLOGY

## 2022-04-29 PROCEDURE — S0020 INJECTION, BUPIVICAINE HYDRO: HCPCS | Performed by: ANESTHESIOLOGY

## 2022-04-29 PROCEDURE — 3074F SYST BP LT 130 MM HG: CPT | Performed by: ANESTHESIOLOGY

## 2022-04-29 RX ORDER — BUPIVACAINE HYDROCHLORIDE 5 MG/ML
18 INJECTION, SOLUTION EPIDURAL; INTRACAUDAL ONCE
Status: COMPLETED | OUTPATIENT
Start: 2022-04-29 | End: 2022-04-29

## 2022-04-29 RX ORDER — TRIAMCINOLONE ACETONIDE 40 MG/ML
80 INJECTION, SUSPENSION INTRA-ARTICULAR; INTRAMUSCULAR ONCE
Status: COMPLETED | OUTPATIENT
Start: 2022-04-29 | End: 2022-04-29

## 2022-04-29 NOTE — PROGRESS NOTES
Timeout completed prior to procedure @ 1115. Participants present for timeout:  Dr. Daya Reed, and patient.

## 2022-04-29 NOTE — PROGRESS NOTES
Patient presents in office today with reported pain in bilateral scapular TPI    Current pain level reported = 3/10    Last reported dose of nothing      Narcotic Contract renewal none    Urine Drug screen none

## 2022-04-29 NOTE — PROCEDURES
Date of procedure: April 29, 2022    Preop diagnosis: Chronic scapular pain    Postop diagnosis: Same procedure: Bilateral scapular border trigger point injections    Surgeon: Pain    Anesthesia: None    EBL: 0    Indication: The patient is a pleasant 79-year-old female with a history of scapular border pain    Procedure detail: Informed consent was obtained. Timeout was done. The skin overlying the scapular border was prepped in usual sterile fashion. A 27-gauge needle was now used to deliver mixture of 80 mg of triamcinolone with 18 cc of 0.5% bupivacaine along both scapular borders. Muscles injected include the trapezius, rhomboid major, and levator scapulae. The needle was then withdrawn tip intact. There is excellent hemostasis. Patient, procedure well.     Varun Mehta MD

## 2022-05-04 ENCOUNTER — OFFICE VISIT (OUTPATIENT)
Dept: PAIN CLINIC | Facility: CLINIC | Age: 77
End: 2022-05-04
Payer: MEDICARE

## 2022-05-04 VITALS
DIASTOLIC BLOOD PRESSURE: 68 MMHG | SYSTOLIC BLOOD PRESSURE: 134 MMHG | BODY MASS INDEX: 22 KG/M2 | OXYGEN SATURATION: 97 % | HEART RATE: 75 BPM | WEIGHT: 128 LBS

## 2022-05-04 DIAGNOSIS — M50.223 HERNIATED NUCLEUS PULPOSUS, C6-7: ICD-10-CM

## 2022-05-04 DIAGNOSIS — G89.29 CHRONIC SCAPULAR PAIN: ICD-10-CM

## 2022-05-04 DIAGNOSIS — M50.30 DDD (DEGENERATIVE DISC DISEASE), CERVICAL: Primary | ICD-10-CM

## 2022-05-04 DIAGNOSIS — M89.8X1 CHRONIC SCAPULAR PAIN: ICD-10-CM

## 2022-05-04 PROCEDURE — 3078F DIAST BP <80 MM HG: CPT | Performed by: ANESTHESIOLOGY

## 2022-05-04 PROCEDURE — 99214 OFFICE O/P EST MOD 30 MIN: CPT | Performed by: ANESTHESIOLOGY

## 2022-05-04 PROCEDURE — 3075F SYST BP GE 130 - 139MM HG: CPT | Performed by: ANESTHESIOLOGY

## 2022-05-04 NOTE — PROGRESS NOTES
Last procedure: CERVICAL EPIDURAL STEROID INJECTION with Valium  Date: 04/21/22  Percentage of relief obtained: 80%  Duration of relief: current    Current Pain Score: 0.5

## 2022-06-28 ENCOUNTER — PATIENT MESSAGE (OUTPATIENT)
Dept: PAIN CLINIC | Facility: CLINIC | Age: 77
End: 2022-06-28

## 2022-06-28 DIAGNOSIS — M54.12 CERVICAL RADICULOPATHY: Primary | ICD-10-CM

## 2022-06-28 NOTE — TELEPHONE ENCOUNTER
From: Rizwan Villavicencio  To: Dusty Stephen MD  Sent: 6/28/2022 6:46 AM CDT  Subject: Hot Nerve light side of neck leading into shoulder. Piper Bear. I have a nerve that is not calming down on the right side of my neck leading into shoulder area. Also slightly on left side as well. wonder if perhaps one more round of trigger point injections would be effective, as it was when we did this the last time. I cannot comfortably do activities without being followed by days of pain.   Thank you,  Sari Petersen

## 2022-06-30 ENCOUNTER — TELEPHONE (OUTPATIENT)
Dept: NEUROLOGY | Facility: CLINIC | Age: 77
End: 2022-06-30

## 2022-06-30 NOTE — TELEPHONE ENCOUNTER
Prior authorization request completed for: Bilateral cervical TPI   Authorization # NO PRIOR AUTHORIZATION REQUIRED  Authorization dates: N/A  CPT codes approved: 35937 / 75119   Number of visits/dates of service approved: 1  Physician: Dr. Hanna Melendez   Location: Office     Call Ref#: [id number]  Representative Name: Monroe Aguilera    Patient can be scheduled. Routed to Navigator.

## 2022-07-15 ENCOUNTER — OFFICE VISIT (OUTPATIENT)
Dept: PAIN CLINIC | Facility: CLINIC | Age: 77
End: 2022-07-15
Payer: MEDICARE

## 2022-07-15 VITALS
OXYGEN SATURATION: 95 % | SYSTOLIC BLOOD PRESSURE: 124 MMHG | WEIGHT: 128 LBS | DIASTOLIC BLOOD PRESSURE: 68 MMHG | BODY MASS INDEX: 22 KG/M2 | HEART RATE: 85 BPM

## 2022-07-15 DIAGNOSIS — M79.18 MYOFASCIAL PAIN: Primary | ICD-10-CM

## 2022-07-15 PROCEDURE — 20553 NJX 1/MLT TRIGGER POINTS 3/>: CPT | Performed by: ANESTHESIOLOGY

## 2022-07-15 PROCEDURE — 3078F DIAST BP <80 MM HG: CPT | Performed by: ANESTHESIOLOGY

## 2022-07-15 PROCEDURE — 3074F SYST BP LT 130 MM HG: CPT | Performed by: ANESTHESIOLOGY

## 2022-07-15 RX ORDER — IPRATROPIUM BROMIDE 17 UG/1
2 AEROSOL, METERED RESPIRATORY (INHALATION) EVERY 6 HOURS PRN
COMMUNITY
Start: 2022-07-06 | End: 2023-07-01

## 2022-07-15 RX ORDER — TRIAMCINOLONE ACETONIDE 40 MG/ML
40 INJECTION, SUSPENSION INTRA-ARTICULAR; INTRAMUSCULAR ONCE
Status: COMPLETED | OUTPATIENT
Start: 2022-07-15 | End: 2022-07-15

## 2022-07-15 RX ORDER — BUPIVACAINE HYDROCHLORIDE 7.5 MG/ML
9 INJECTION, SOLUTION EPIDURAL; RETROBULBAR ONCE
Status: COMPLETED | OUTPATIENT
Start: 2022-07-15 | End: 2022-07-15

## 2022-07-15 NOTE — PROCEDURES
Date of procedure: July 15, 2022    Preop diagnosis: Cervical myofascial pain    Procedure: Bilateral cervical trigger point injection    Surgeon: Salena Barrientos    Anesthesia: None    EBL: 0    Indication: The patient is a pleasant 44-year-old female with history of cervical myofascial pain, particularly in the shoulders    Procedural detail: Informed consent was obtained. A timeout was done. The skin overlying the neck and shoulder was prepped in usual sterile fashion. Subsequently, a 27-gauge needle was used to deliver a mixture of 40 mg of triamcinolone with 9 cc of 0.75% bupivacaine into multiple trigger points. Muscles injected include the levator scapulae, the trapezius, and the rhomboid major. The needles and withdrawn tip intact. Patient tolerated procedure well.     Salena Barrientos MD

## (undated) DIAGNOSIS — Z01.818 PREOP EXAMINATION: ICD-10-CM

## (undated) DIAGNOSIS — M54.12 CERVICAL RADICULOPATHY: Primary | ICD-10-CM

## (undated) DEVICE — GLOVE SURG SENSICARE SZ 6-1/2

## (undated) DEVICE — Device

## (undated) DEVICE — BANDAID COVERLET 1X3

## (undated) DEVICE — GLOVE SURG SENSICARE SZ 7-1/2

## (undated) DEVICE — AVANOS* TUOHY EPIDURAL NEEDLE: Brand: AVANOS

## (undated) DEVICE — REMOVER DURAPREP 3M

## (undated) DEVICE — GLOVE SURG SENSICARE SZ 7

## (undated) DEVICE — PAIN TRAY: Brand: MEDLINE INDUSTRIES, INC.

## (undated) DEVICE — CAP,BOUFFANT,SPUNBOND,BLUE,24": Brand: MEDLINE INDUSTRIES, INC.

## (undated) DEVICE — NEEDLE SPINAL 22X3-1/2 BLK

## (undated) DEVICE — MARKER PRE-SURGICAL MINI DISP

## (undated) DEVICE — 3M™ TEGADERM™ TRANSPARENT FILM DRESSING, 1626W, 4 IN X 4-3/4 IN (10 CM X 12 CM), 50 EACH/CARTON, 4 CARTON/CASE: Brand: 3M™ TEGADERM™

## (undated) NOTE — LETTER
Patient Name: Haylie Ervin        : 1945       Medical Record #: [id number]    CONSENT FOR PROCEDURES/SEDATION    Date: 2019       Time: 1:23 PM        1.  I authorize the performance upon Haylie Ervin the following:    Scapular border trig

## (undated) NOTE — LETTER
Date: 9/5/2018    Patient Name: Yolanda Craft          To Whom it may concern: This letter has been written at the patient's request. The above patient was seen at the [facility] for treatment of a medical condition.     This patient should

## (undated) NOTE — LETTER
Date: 7/18/2018    Patient Name: Zoila Singleton Leave and Absence  Attn: Samir Mari  [phone] Fax      To Whom It May Concern:    I am taking over management of a pain issue, previously treated by Dr. Dimas Spain.  This letter has been wr

## (undated) NOTE — LETTER
Esperanza Simeon [address]  [place], [address] [facility]    Consent for Operation  Date: __________________                                Time: _______________    1.  I authorize the performance upon Percell Víctor the following operation:  Procedure(s procedure has been videotaped, the surgeon will obtain the original videotape. The hospital will not be responsible for storage or maintenance of this tape.   7. For the purpose of advancing medical education, I consent to the admittance of observers to the STATEMENTS REQUIRING INSERTION OR COMPLETION WERE FILLED IN.     Signature of Patient:   ___________________________    When the patient is a minor or mentally incompetent to give consent:  Signature of person authorized to consent for patient: ____________ drugs/illegal medications). Failure to inform my anesthesiologist about these medicines may increase my risk of anesthetic complications. iv. If I am allergic to anything or have had a reaction to anesthesia before.   3. I understand how the anesthesia med I have discussed the procedure and information above with the patient (or patient’s representative) and answered their questions. The patient or their representative has agreed to have anesthesia services.     _______________________________________________

## (undated) NOTE — LETTER
Date: 5/3/2019    Patient Name: Salvador Hinson          To Whom it may concern: This letter has been written at the patient's request. The above patient was seen at the [facility] on 5/2/19 for treatment of a medical condition.     This bhavya

## (undated) NOTE — LETTER
Esperazna Simeon [address] [address]  [place], [address] [place] [address]    Consent for Operation  Date: __________________                                Time: _______________    1.  I authorize the performance upon Marnie Scott the following operation:  Procedure(s procedure has been videotaped, the surgeon will obtain the original videotape. The hospital will not be responsible for storage or maintenance of this tape.   7. For the purpose of advancing medical education, I consent to the admittance of observers to the STATEMENTS REQUIRING INSERTION OR COMPLETION WERE FILLED IN.     Signature of Patient:   ___________________________    When the patient is a minor or mentally incompetent to give consent:  Signature of person authorized to consent for patient: ____________ drugs/illegal medications). Failure to inform my anesthesiologist about these medicines may increase my risk of anesthetic complications. iv. If I am allergic to anything or have had a reaction to anesthesia before.   3. I understand how the anesthesia med I have discussed the procedure and information above with the patient (or patient’s representative) and answered their questions. The patient or their representative has agreed to have anesthesia services.     _______________________________________________

## (undated) NOTE — LETTER
Date: 11/1/2018    Patient Name: Georgette Rodgers          To Whom it may concern: This letter has been written at the patient's request. The above patient was seen at the [facility] for treatment of a medical condition.     This patient shoul

## (undated) NOTE — LETTER
Patient Name: Markus Hair        : 1945       Medical Record #: [id number]    CONSENT FOR PROCEDURES/SEDATION    Date: 2018       Time: 11:31 AM        1.  I authorize the performance upon Markus Hair the following:  Right Scapular Border

## (undated) NOTE — LETTER
Patient Name: Skyla Guillen        : 1945       Medical Record #: [id number]    CONSENT FOR PROCEDURES/SEDATION    Date: 2019       Time: 1:34 PM        1.  I authorize the performance upon Skyla Guillen the following:  Bilateral scapular jackie

## (undated) NOTE — LETTER
Esperanza Simeon [address] [address]  [place], [address] [facility]    Consent for Operation  Date: __________________                                Time: _______________    1.  I authorize the performance upon Percell Víctor the following operation:  Procedure(s procedure has been videotaped, the surgeon will obtain the original videotape. The hospital will not be responsible for storage or maintenance of this tape.   7. For the purpose of advancing medical education, I consent to the admittance of observers to the STATEMENTS REQUIRING INSERTION OR COMPLETION WERE FILLED IN.     Signature of Patient:   ___________________________    When the patient is a minor or mentally incompetent to give consent:  Signature of person authorized to consent for patient: ____________ drugs/illegal medications). Failure to inform my anesthesiologist about these medicines may increase my risk of anesthetic complications. iv. If I am allergic to anything or have had a reaction to anesthesia before.   3. I understand how the anesthesia med I have discussed the procedure and information above with the patient (or patient’s representative) and answered their questions. The patient or their representative has agreed to have anesthesia services.     _______________________________________________

## (undated) NOTE — LETTER
Date: 9/6/2018    Patient Name: Alexandra Kruger        To Whom it may concern: This letter has been written at the patient's request. The above patient was seen at the [facility] for treatment of a medical condition.     This patient should b

## (undated) NOTE — LETTER
Date: 9/25/2018     Patient Name: Chauncey Wright              To Whom it May Concern:     This letter has been written at the patient's request. The above patient was seen at the [facility] for treatment of a medical condition on 9/24/18.

## (undated) NOTE — LETTER
Patient Name: Leonora Aldrich        : 1945       Medical Record #: [id number]    CONSENT FOR PROCEDURES/SEDATION    Date: 2021       Time: 11:08 AM        1.  I authorize the performance upon Leonora Aldrich the following:  BILATERAL THORACIC TRI

## (undated) NOTE — LETTER
Patient Name: Afshan Acosta        : 1945       Medical Record #: [id number]    CONSENT FOR PROCEDURES/SEDATION    Date: 2020       Time: 11:39 AM        1.  I authorize the performance upon Afshan Acosta the following:  BILATERAL SCAPULAR BOR

## (undated) NOTE — LETTER
Date: 10/25/2018    Patient Name: Alexandra Kruger          To Whom it may concern: This letter has been written at the patient's request. The above patient was seen at the [facility] for treatment of a medical condition.     This patient marcos

## (undated) NOTE — LETTER
Date: 9/24/2018    Patient Name: Naomi Smoker          To Whom it May Concern: This letter has been written at the patient's request. The above patient was seen at the [facility] for treatment of a medical condition on 9/24/18.     We are